# Patient Record
Sex: FEMALE | Race: ASIAN | Employment: UNEMPLOYED | URBAN - METROPOLITAN AREA
[De-identification: names, ages, dates, MRNs, and addresses within clinical notes are randomized per-mention and may not be internally consistent; named-entity substitution may affect disease eponyms.]

---

## 2018-10-04 ENCOUNTER — OFFICE VISIT (OUTPATIENT)
Dept: FAMILY MEDICINE CLINIC | Age: 28
End: 2018-10-04

## 2018-10-04 VITALS
DIASTOLIC BLOOD PRESSURE: 70 MMHG | HEART RATE: 76 BPM | OXYGEN SATURATION: 98 % | BODY MASS INDEX: 19.94 KG/M2 | WEIGHT: 95 LBS | RESPIRATION RATE: 14 BRPM | TEMPERATURE: 98.7 F | HEIGHT: 58 IN | SYSTOLIC BLOOD PRESSURE: 104 MMHG

## 2018-10-04 DIAGNOSIS — R53.82 CHRONIC FATIGUE: ICD-10-CM

## 2018-10-04 DIAGNOSIS — J32.4 CHRONIC PANSINUSITIS: Primary | ICD-10-CM

## 2018-10-04 DIAGNOSIS — M62.830 LUMBAR PARASPINAL MUSCLE SPASM: ICD-10-CM

## 2018-10-04 DIAGNOSIS — R42 DIZZINESS: ICD-10-CM

## 2018-10-04 RX ORDER — AZITHROMYCIN 250 MG/1
TABLET, FILM COATED ORAL
Qty: 6 TAB | Refills: 0 | Status: SHIPPED | OUTPATIENT
Start: 2018-10-04 | End: 2018-10-09

## 2018-10-04 RX ORDER — MELOXICAM 7.5 MG/1
7.5 TABLET ORAL
Qty: 30 TAB | Refills: 2 | Status: SHIPPED | OUTPATIENT
Start: 2018-10-04 | End: 2019-01-21 | Stop reason: SDUPTHER

## 2018-10-04 RX ORDER — CETIRIZINE HCL 10 MG
10 TABLET ORAL
Qty: 30 TAB | Refills: 0 | Status: SHIPPED | OUTPATIENT
Start: 2018-10-04 | End: 2018-10-30 | Stop reason: SDUPTHER

## 2018-10-04 RX ORDER — PREDNISONE 5 MG/1
5 TABLET ORAL 2 TIMES DAILY
Qty: 10 TAB | Refills: 0 | Status: SHIPPED | OUTPATIENT
Start: 2018-10-04 | End: 2018-12-18

## 2018-10-04 NOTE — MR AVS SNAPSHOT
[facility] 
 
 
 [address] [place] [address] 
[phone] Patient: Gabbie Kelly MRN: [id number] HUH:[id number] Visit Information Date & Time Provider Department Dept. Phone Encounter #  
 10/4/2018 11:00 AM Feng Cross, [address] [facility] [phone] [id number] Follow-up Instructions Return in about 4 months (around 2/4/2019) for well woman. Your Appointments 1/9/2019  3:00 PM  
New Patient with Feng Cross MD  
[facility]) Appt Note: NP/ weak and dizzy/07/1818.kf; R/S 9/12/18 NEW PT 0cp0pb arh 9/12/18  
 [address] [address]  
[phone]  
  
   
 [address] [address] Upcoming Health Maintenance Date Due Influenza Age 5 to Adult 3/31/2019* PAP AKA CERVICAL CYTOLOGY 7/1/2019 DTaP/Tdap/Td series (2 - Td) 1/23/2024 *Topic was postponed. The date shown is not the original due date. Allergies as of 10/4/2018  Review Complete On: 10/4/2018 By: Feng Cross MD  
 No Known Allergies Current Immunizations  Reviewed on 3/4/2014 Name Date Hep B Vaccine 10/23/2013, 8/27/2013 Influenza Vaccine 1/23/2014 MMR 10/23/2013, 8/27/2013 Td 8/27/2013 Tdap 1/23/2014 Not reviewed this visit You Were Diagnosed With   
  
 Codes Comments Chronic pansinusitis    -  Primary ICD-10-CM: J32.4 ICD-9-CM: 473.8 Lumbar paraspinal muscle spasm     ICD-10-CM: R94.580 ICD-9-CM: 724.8 Chronic fatigue     ICD-10-CM: R53.82 
ICD-9-CM: 780.79 Dizziness     ICD-10-CM: F90 ICD-9-CM: 780.4 Vitals BP Pulse Temp Resp Height(growth percentile) Weight(growth percentile) 104/70 (BP 1 Location: Left arm, BP Patient Position: Sitting) 76 98.7 °F (37.1 °C) (Oral) 14 4' 10\" (1.473 m) 95 lb (43.1 kg) LMP SpO2 BMI OB Status Smoking Status 09/23/2018 98% 19.86 kg/m2 Having regular periods Never Smoker Vitals History BMI and BSA Data Body Mass Index Body Surface Area  
 19.86 kg/m 2 1.33 m 2 Preferred Pharmacy Pharmacy Name Phone [address] [place] [address], [address] [place] [address] Rd. [phone] Your Updated Medication List  
  
   
This list is accurate as of 10/4/18 11:52 AM.  Always use your most recent med list.  
  
  
  
  
 azithromycin 250 mg tablet Commonly known as:  Fazal Marcus Take 2 tablets today, then take 1 tablet daily  
  
 cetirizine 10 mg tablet Commonly known as:  ZYRTEC Take 1 Tab by mouth nightly. meloxicam 7.5 mg tablet Commonly known as:  MOBIC Take 1 Tab by mouth nightly. predniSONE 5 mg tablet Commonly known as:  Latrelle Bryon Take 1 Tab by mouth two (2) times a day. Prescriptions Sent to Pharmacy Refills  
 meloxicam (MOBIC) 7.5 mg tablet 2 Sig: Take 1 Tab by mouth nightly. Class: Normal  
 Pharmacy: [facility] DR SABAS Rai , [address] [place] [address] Ph #: [phone] Route: Oral  
 azithromycin (ZITHROMAX) 250 mg tablet 0 Sig: Take 2 tablets today, then take 1 tablet daily Class: Normal  
 Pharmacy: [facility] DR SABAS SANTIZO [address] [place] [address],[facility] Ph #: [phone]  
 predniSONE (DELTASONE) 5 mg tablet 0 Sig: Take 1 Tab by mouth two (2) times a day. Class: Normal  
 Pharmacy: [facility] DR SABAS Rai , [address] [place] [address] Ph #: [phone] Route: Oral  
 cetirizine (ZYRTEC) 10 mg tablet 0 Sig: Take 1 Tab by mouth nightly. Class: Normal  
 Pharmacy: [facility] DR SABAS Rai , [address] [place] [address] Ph #: [phone] Route: Oral  
  
We Performed the Following CBC WITH AUTOMATED DIFF [85878 CPT(R)] METABOLIC PANEL, COMPREHENSIVE [93655 CPT(R)] TSH REFLEX TO T4 [68227 CPT(R)] VITAMIN D, 25 HYDROXY [id number] CPT(R)] Follow-up Instructions Return in about 4 months (around 2/4/2019) for well woman. Patient Instructions Dizziness: Care Instructions Your Care Instructions Dizziness is the feeling of unsteadiness or fuzziness in your head. It is different than having vertigo, which is a feeling that the room is spinning or that you are moving or falling. It is also different from lightheadedness, which is the feeling that you are about to faint. It can be hard to know what causes dizziness. Some people feel dizzy when they have migraine headaches. Sometimes bouts of flu can make you feel dizzy. Some medical conditions, such as heart problems or high blood pressure, can make you feel dizzy. Many medicines can cause dizziness, including medicines for high blood pressure, pain, or anxiety. If a medicine causes your symptoms, your doctor may recommend that you stop or change the medicine. If it is a problem with your heart, you may need medicine to help your heart work better. If there is no clear reason for your symptoms, your doctor may suggest watching and waiting for a while to see if the dizziness goes away on its own. Follow-up care is a key part of your treatment and safety. Be sure to make and go to all appointments, and call your doctor if you are having problems. It's also a good idea to know your test results and keep a list of the medicines you take. How can you care for yourself at home? · If your doctor recommends or prescribes medicine, take it exactly as directed. Call your doctor if you think you are having a problem with your medicine. · Do not drive while you feel dizzy. · Try to prevent falls. Steps you can take include: ¨ Using nonskid mats, adding grab bars near the tub, and using night-lights. ¨ Clearing your home so that walkways are free of anything you might trip on. ¨ Letting family and friends know that you have been feeling dizzy. This will help them know how to help you. When should you call for help? Call 911 anytime you think you may need emergency care. For example, call if: 
  · You passed out (lost consciousness).  
  · You have dizziness along with symptoms of a heart attack. These may include: ¨ Chest pain or pressure, or a strange feeling in the chest. 
¨ Sweating. ¨ Shortness of breath. ¨ Nausea or vomiting. ¨ Pain, pressure, or a strange feeling in the back, neck, jaw, or upper belly or in one or both shoulders or arms. ¨ Lightheadedness or sudden weakness. ¨ A fast or irregular heartbeat.  
  · You have symptoms of a stroke. These may include: 
¨ Sudden numbness, tingling, weakness, or loss of movement in your face, arm, or leg, especially on only one side of your body. ¨ Sudden vision changes. ¨ Sudden trouble speaking. ¨ Sudden confusion or trouble understanding simple statements. ¨ Sudden problems with walking or balance. ¨ A sudden, severe headache that is different from past headaches.  
 Call your doctor now or seek immediate medical care if: 
  · You feel dizzy and have a fever, headache, or ringing in your ears.  
  · You have new or increased nausea and vomiting.  
  · Your dizziness does not go away or comes back.  
 Watch closely for changes in your health, and be sure to contact your doctor if: 
  · You do not get better as expected. Where can you learn more? Go to http://isak-norbert.info/. Enter E905 in the search box to learn more about \"Dizziness: Care Instructions. \" Current as of: November 20, 2017 Content Version: 11.8 © 9598-7377 Healthwise, Incorporated. Care instructions adapted under license by [employer] (which disclaims liability or warranty for this information). If you have questions about a medical condition or this instruction, always ask your healthcare professional. [place] [address] any warranty or liability for your use of this information. Introducing [facility] SERVICES! [employer] introduces Xatori patient portal. Now you can access parts of your medical record, email your doctor's office, and request medication refills online. 1. In your internet browser, go to [email or web address] 2. Click on the First Time User? Click Here link in the Sign In box. You will see the New Member Sign Up page. 3. Enter your Xatori Access Code exactly as it appears below. You will not need to use this code after youve completed the sign-up process. If you do not sign up before the expiration date, you must request a new code. · Xatori Access Code: [email or web address] Expires: 11/25/2018  5:21 AM 
 
4. Enter the last four digits of your Social Security Number (xxxx) and Date of Birth (mm/dd/yyyy) as indicated and click Submit. You will be taken to the next sign-up page. 5. Create a Xatori ID. This will be your Xatori login ID and cannot be changed, so think of one that is secure and easy to remember. 6. Create a Xatori password. You can change your password at any time. 7. Enter your Password Reset Question and Answer. This can be used at a later time if you forget your password. 8. Enter your e-mail address. You will receive e-mail notification when new information is available in [address] 9. Click Sign Up. You can now view and download portions of your medical record. 10. Click the Download Summary menu link to download a portable copy of your medical information. If you have questions, please visit the Frequently Asked Questions section of the Xatori website. Remember, Xatori is NOT to be used for urgent needs. For medical emergencies, dial 911. Now available from your iPhone and Android! Please provide this summary of care documentation to your next provider. Your primary care clinician is listed as Laine Fitzpatrick. If you have any questions after today's visit, please call [phone].

## 2018-10-04 NOTE — PROGRESS NOTES
Chief Complaint Patient presents with  Dizziness  
  symptoms occuring for a month  Back Pain  Head Pain  
  symptoms continous , sometimes notices blood from nose 1. Have you been to the ER, urgent care clinic since your last visit? Hospitalized since your last visit? No 
 
2. Have you seen or consulted any other health care providers outside of the [address] [place] [address] since your last visit? Include any pap smears or colon screening.  No

## 2018-10-04 NOTE — PROGRESS NOTES
HISTORY OF PRESENT ILLNESS Brijesh Garrett is a 25 y.o. female. not seen in office for more than 3 years. seen today to reestablish care and concern of worsening headache,dizziness,fatigue and lower back pain. HPI Neurological Review She is here today to talk about headaches. This is a chronic problem. Course to date has been intermittent. Headaches are occuring daily and duration of individual headaches are a few hours ago. The pain is described as throbbing pain, bilateral in the frontal area, bilateral in the occipital area. Associated symptoms: congestion and dizziness. Denies fainting, fever, flashing lights, light sensitivity, nausea and vomiting Medications being used include daily Tylenol. She is taking medications as instructed and no medication side effects noted. Back Pain Patient presents for presents evaluation of low back problems. Symptoms have been present for several months and include pain in both side lower back (aching, dull in character; 5/10 in severity), stiffness in lower back, denies weakness, denies numbness, denies paresthesias. Initial inciting event: none. Symptoms are worst: evening, nighttime. Alleviating factors identifiable by patient are medication and massage and topical analgesic. Exacerbating factors identifiable by patient are standing, sitting, walking. Treatments so far initiated by patient: prn Tylenol and topical gel along with massage Previous lower back problems: none. Previous workup: none. Previous treatments: none. Fatigue Patient complains of fatigue. Symptoms began problem is longstanding. Sentinal symptom the patient feels fatigue began with: excessive menstrual bleeding. Symptoms of her fatigue have been general malaise, diffuse soft tissue aches and pains, headaches. Patient describes the following psychologic symptoms: none.   Patient denies fever, significant change in weight, symptoms of true arthritis, true exercise intolerance, unusual rashes, cold intolerance, constipation and change in hair texture., GI blood loss. The course has been gradually worsening. Severity has been symptoms bothersome, but easily able to carry out all usual work/school/family. Previous visits for this problem: none Duncan Jimenez Review of Systems Constitutional: Positive for malaise/fatigue. Negative for chills and fever. HENT: Negative for congestion, ear pain, sore throat and tinnitus. Eyes: Negative for blurred vision, double vision, pain and discharge. Respiratory: Negative for cough, shortness of breath and wheezing. Cardiovascular: Negative for chest pain, palpitations and leg swelling. Gastrointestinal: Negative for abdominal pain, blood in stool, constipation, diarrhea, nausea and vomiting. Genitourinary: Negative for dysuria, frequency, hematuria and urgency. Musculoskeletal: Positive for back pain. Negative for joint pain and myalgias. Skin: Negative for rash. Neurological: Positive for dizziness and headaches. Negative for tremors and seizures. Endo/Heme/Allergies: Negative for polydipsia. Does not bruise/bleed easily. Psychiatric/Behavioral: Negative for depression and substance abuse. The patient is not nervous/anxious. Physical Exam  
Constitutional: She is oriented to person, place, and time. She appears well-developed and well-nourished. No distress. HENT:  
Head: Normocephalic and atraumatic. Right Ear: Tympanic membrane and external ear normal. No drainage. Tympanic membrane is not injected. No middle ear effusion. No decreased hearing is noted. Left Ear: Tympanic membrane normal. No drainage. Tympanic membrane is not injected. No middle ear effusion. No decreased hearing is noted. Nose: Mucosal edema present. No rhinorrhea. Right sinus exhibits maxillary sinus tenderness and frontal sinus tenderness. Left sinus exhibits maxillary sinus tenderness and frontal sinus tenderness. Mouth/Throat: Uvula is midline. Posterior oropharyngeal erythema present. No oropharyngeal exudate. Nasal mucosa congested, edematous Eyes: Conjunctivae and EOM are normal. Pupils are equal, round, and reactive to light. No scleral icterus. Neck: Normal range of motion. Neck supple. No JVD present. No thyromegaly present. Cardiovascular: Normal rate, regular rhythm, normal heart sounds and intact distal pulses. No murmur heard. Pulmonary/Chest: Effort normal and breath sounds normal. She has no wheezes. She has no rales. Abdominal: Soft. Bowel sounds are normal. She exhibits no distension and no mass. Musculoskeletal: Normal range of motion. She exhibits no edema. Lumbar back: She exhibits tenderness, pain and spasm. She exhibits normal range of motion and no bony tenderness. Back: 
 
Lymphadenopathy:  
     Head (right side): No tonsillar adenopathy present. Head (left side): No tonsillar adenopathy present. She has no cervical adenopathy. Neurological: She is alert and oriented to person, place, and time. She has normal reflexes. No cranial nerve deficit. Skin: Skin is warm and dry. No rash noted. She is not diaphoretic. Psychiatric: She has a normal mood and affect. Nursing note and vitals reviewed. ASSESSMENT and PLAN Diagnoses and all orders for this visit: 
 
1. Chronic pansinusitis 
-     azithromycin (ZITHROMAX) 250 mg tablet; Take 2 tablets today, then take 1 tablet daily -     predniSONE (DELTASONE) 5 mg tablet; Take 1 Tab by mouth two (2) times a day. -     cetirizine (ZYRTEC) 10 mg tablet; Take 1 Tab by mouth nightly. 2. Lumbar paraspinal muscle spasm 
-     meloxicam (MOBIC) 7.5 mg tablet; Take 1 Tab by mouth nightly. 3. Chronic fatigue 
-     CBC WITH AUTOMATED DIFF 
-     METABOLIC PANEL, COMPREHENSIVE 
-     TSH REFLEX TO T4 
-     VITAMIN D, 25 HYDROXY 4. Dizziness 
-     CBC WITH AUTOMATED DIFF 
 
 Discussed lifestyle issues and health guidance given Patient was given an after visit summary which includes diagnoses, vital signs, current medications, instructions and references & authorized prescriptions . Results of labs will be conveyed to patient, once available. Pt verbalized instructions I provided and expressed understanding of discussion that was held today. Follow-up Disposition: 
Return in about 4 months (around 2/4/2019) for well woman.

## 2018-10-04 NOTE — PATIENT INSTRUCTIONS
Dizziness: Care Instructions Your Care Instructions Dizziness is the feeling of unsteadiness or fuzziness in your head. It is different than having vertigo, which is a feeling that the room is spinning or that you are moving or falling. It is also different from lightheadedness, which is the feeling that you are about to faint. It can be hard to know what causes dizziness. Some people feel dizzy when they have migraine headaches. Sometimes bouts of flu can make you feel dizzy. Some medical conditions, such as heart problems or high blood pressure, can make you feel dizzy. Many medicines can cause dizziness, including medicines for high blood pressure, pain, or anxiety. If a medicine causes your symptoms, your doctor may recommend that you stop or change the medicine. If it is a problem with your heart, you may need medicine to help your heart work better. If there is no clear reason for your symptoms, your doctor may suggest watching and waiting for a while to see if the dizziness goes away on its own. Follow-up care is a key part of your treatment and safety. Be sure to make and go to all appointments, and call your doctor if you are having problems. It's also a good idea to know your test results and keep a list of the medicines you take. How can you care for yourself at home? · If your doctor recommends or prescribes medicine, take it exactly as directed. Call your doctor if you think you are having a problem with your medicine. · Do not drive while you feel dizzy. · Try to prevent falls. Steps you can take include: ¨ Using nonskid mats, adding grab bars near the tub, and using night-lights. ¨ Clearing your home so that walkways are free of anything you might trip on. ¨ Letting family and friends know that you have been feeling dizzy. This will help them know how to help you. When should you call for help? Call 911 anytime you think you may need emergency care. For example, call if:   · You passed out (lost consciousness).  
  · You have dizziness along with symptoms of a heart attack. These may include: ¨ Chest pain or pressure, or a strange feeling in the chest. 
¨ Sweating. ¨ Shortness of breath. ¨ Nausea or vomiting. ¨ Pain, pressure, or a strange feeling in the back, neck, jaw, or upper belly or in one or both shoulders or arms. ¨ Lightheadedness or sudden weakness. ¨ A fast or irregular heartbeat.  
  · You have symptoms of a stroke. These may include: 
¨ Sudden numbness, tingling, weakness, or loss of movement in your face, arm, or leg, especially on only one side of your body. ¨ Sudden vision changes. ¨ Sudden trouble speaking. ¨ Sudden confusion or trouble understanding simple statements. ¨ Sudden problems with walking or balance. ¨ A sudden, severe headache that is different from past headaches.  
 Call your doctor now or seek immediate medical care if: 
  · You feel dizzy and have a fever, headache, or ringing in your ears.  
  · You have new or increased nausea and vomiting.  
  · Your dizziness does not go away or comes back.  
 Watch closely for changes in your health, and be sure to contact your doctor if: 
  · You do not get better as expected. Where can you learn more? Go to http://isak-norbert.info/. Enter Z632 in the search box to learn more about \"Dizziness: Care Instructions. \" Current as of: November 20, 2017 Content Version: 11.8 © 6271-7004 [employer]. Care instructions adapted under license by [employer] (which disclaims liability or warranty for this information). If you have questions about a medical condition or this instruction, always ask your healthcare professional. [place] [address] any warranty or liability for your use of this information.

## 2018-10-05 LAB
25(OH)D3+25(OH)D2 SERPL-MCNC: 17 NG/ML (ref 30–100)
ALBUMIN SERPL-MCNC: 4.8 G/DL (ref 3.5–5.5)
ALBUMIN/GLOB SERPL: 1.3 {RATIO} (ref 1.2–2.2)
ALP SERPL-CCNC: 54 IU/L (ref 39–117)
ALT SERPL-CCNC: 10 IU/L (ref 0–32)
AST SERPL-CCNC: 15 IU/L (ref 0–40)
BASOPHILS # BLD AUTO: 0 X10E3/UL (ref 0–0.2)
BASOPHILS NFR BLD AUTO: 1 %
BILIRUB SERPL-MCNC: 0.9 MG/DL (ref 0–1.2)
BUN SERPL-MCNC: 11 MG/DL (ref 6–20)
BUN/CREAT SERPL: 20 (ref 9–23)
CALCIUM SERPL-MCNC: 9.4 MG/DL (ref 8.7–10.2)
CHLORIDE SERPL-SCNC: 103 MMOL/L (ref 96–106)
CO2 SERPL-SCNC: 22 MMOL/L (ref 20–29)
CREAT SERPL-MCNC: 0.54 MG/DL (ref 0.57–1)
EOSINOPHIL # BLD AUTO: 0.2 X10E3/UL (ref 0–0.4)
EOSINOPHIL NFR BLD AUTO: 3 %
ERYTHROCYTE [DISTWIDTH] IN BLOOD BY AUTOMATED COUNT: 14.7 % (ref 12.3–15.4)
GLOBULIN SER CALC-MCNC: 3.6 G/DL (ref 1.5–4.5)
GLUCOSE SERPL-MCNC: 92 MG/DL (ref 65–99)
HCT VFR BLD AUTO: 37.5 % (ref 34–46.6)
HGB BLD-MCNC: 12.6 G/DL (ref 11.1–15.9)
IMM GRANULOCYTES # BLD: 0 X10E3/UL (ref 0–0.1)
IMM GRANULOCYTES NFR BLD: 0 %
LYMPHOCYTES # BLD AUTO: 2.5 X10E3/UL (ref 0.7–3.1)
LYMPHOCYTES NFR BLD AUTO: 36 %
MCH RBC QN AUTO: 30.2 PG (ref 26.6–33)
MCHC RBC AUTO-ENTMCNC: 33.6 G/DL (ref 31.5–35.7)
MCV RBC AUTO: 90 FL (ref 79–97)
MONOCYTES # BLD AUTO: 0.4 X10E3/UL (ref 0.1–0.9)
MONOCYTES NFR BLD AUTO: 7 %
NEUTROPHILS # BLD AUTO: 3.6 X10E3/UL (ref 1.4–7)
NEUTROPHILS NFR BLD AUTO: 53 %
PLATELET # BLD AUTO: 294 X10E3/UL (ref 150–379)
POTASSIUM SERPL-SCNC: 4.2 MMOL/L (ref 3.5–5.2)
PROT SERPL-MCNC: 8.4 G/DL (ref 6–8.5)
RBC # BLD AUTO: 4.17 X10E6/UL (ref 3.77–5.28)
SODIUM SERPL-SCNC: 139 MMOL/L (ref 134–144)
TSH SERPL DL<=0.005 MIU/L-ACNC: 2.88 UIU/ML (ref 0.45–4.5)
WBC # BLD AUTO: 6.8 X10E3/UL (ref 3.4–10.8)

## 2018-10-05 RX ORDER — ERGOCALCIFEROL 1.25 MG/1
50000 CAPSULE ORAL
Qty: 4 CAP | Refills: 5 | Status: SHIPPED | OUTPATIENT
Start: 2018-10-05 | End: 2019-01-09 | Stop reason: SDUPTHER

## 2018-10-05 NOTE — PROGRESS NOTES
Outbound call to patient. Name and  verified. Reviewed recent labs with patient. She verbalized understanding.

## 2018-10-05 NOTE — PROGRESS NOTES
Please inform patient. Blood count, kidney, liver, thyroid results are normal 
Vitamin D is very low, will send Rx to pharmacy, to take as directed for 6 months. Dizziness and headache most likely from her sinusitis. To complete course To keep appointment in January for pelvic exam, as she c/o heavy painful periods.  
thanks

## 2018-10-30 DIAGNOSIS — J32.4 CHRONIC PANSINUSITIS: ICD-10-CM

## 2018-10-31 RX ORDER — CETIRIZINE HYDROCHLORIDE 10 MG/1
TABLET, FILM COATED ORAL
Qty: 30 TAB | Refills: 0 | Status: SHIPPED | OUTPATIENT
Start: 2018-10-31 | End: 2019-01-09 | Stop reason: ALTCHOICE

## 2018-12-18 ENCOUNTER — OFFICE VISIT (OUTPATIENT)
Dept: URGENT CARE | Age: 28
End: 2018-12-18

## 2018-12-18 VITALS
WEIGHT: 97 LBS | RESPIRATION RATE: 18 BRPM | TEMPERATURE: 98.4 F | DIASTOLIC BLOOD PRESSURE: 53 MMHG | OXYGEN SATURATION: 99 % | BODY MASS INDEX: 20.36 KG/M2 | SYSTOLIC BLOOD PRESSURE: 100 MMHG | HEIGHT: 58 IN | HEART RATE: 62 BPM

## 2018-12-18 DIAGNOSIS — J06.9 VIRAL UPPER RESPIRATORY ILLNESS: ICD-10-CM

## 2018-12-18 DIAGNOSIS — J02.9 SORE THROAT: ICD-10-CM

## 2018-12-18 LAB
S PYO AG THROAT QL: NEGATIVE
VALID INTERNAL CONTROL?: YES

## 2018-12-18 RX ORDER — BENZONATATE 100 MG/1
100 CAPSULE ORAL
Qty: 21 CAP | Refills: 0 | Status: SHIPPED | OUTPATIENT
Start: 2018-12-18 | End: 2018-12-25

## 2018-12-18 RX ORDER — GUAIFENESIN 600 MG/1
600 TABLET, EXTENDED RELEASE ORAL 2 TIMES DAILY
Qty: 14 TAB | Refills: 0 | Status: SHIPPED | OUTPATIENT
Start: 2018-12-18 | End: 2018-12-25

## 2018-12-18 NOTE — PROGRESS NOTES
Arcadio Mohs is a 25 y.o. female who present complaining of cold-like symptoms: dry cough, nasal congestion, runny nose, sore throat. Symptom onset 2 days ago without any preceding illness. Has tried warm water with some relief in sore throat . No aggravating or alleviating factors. Symptoms are constant and overall worsening. Promotes no decrease in PO intake of fluids. Specifically denies: severe lethargy, SOB, syncope/near syncope, vomiting/diarrhea, chest pain, chest pain with breathing, labored breathing, severe headache, non-intractable fevers . Hx significant for:  has a past medical history of Migraine. Not a smoker               Past Medical History:   Diagnosis Date    Migraine     since age 11 years        Past Surgical History:   Procedure Laterality Date    HX GYN      2016         Family History   Problem Relation Age of Onset    No Known Problems Mother     No Known Problems Father         Social History     Socioeconomic History    Marital status: SINGLE     Spouse name: Not on file    Number of children: Not on file    Years of education: Not on file    Highest education level: Not on file   Social Needs    Financial resource strain: Not on file    Food insecurity - worry: Not on file    Food insecurity - inability: Not on file   [employer] needs - medical: Not on file   [employer] needs - non-medical: Not on file   Occupational History    Not on file   Tobacco Use    Smoking status: Never Smoker    Smokeless tobacco: Never Used   Substance and Sexual Activity    Alcohol use: No     Alcohol/week: 0.0 oz    Drug use: No    Sexual activity: Yes     Partners: Male   Other Topics Concern    Not on file   Social History Narrative    Not on file                ALLERGIES: Patient has no known allergies. Review of Systems   Constitutional: Negative for chills and fever. Respiratory: Negative for shortness of breath and wheezing.     Cardiovascular: Negative for chest pain and palpitations. Gastrointestinal: Negative for nausea and vomiting. Musculoskeletal: Negative for neck stiffness. Skin: Negative for rash. Neurological: Negative for dizziness and weakness. All other systems reviewed and are negative. Vitals:    12/18/18 1318 12/18/18 1321   BP:  100/53   Pulse:  62   Resp:  18   Temp:  98.4 °F (36.9 °C)   SpO2:  99%   Weight:  97 lb (44 kg)   Height: 4' 10\" (1.473 m) 4' 10\" (1.473 m)       Physical Exam   Constitutional: She is oriented to person, place, and time. No distress. Non-toxic appearing, well hydrated   HENT:     TMs normal appearing bilat  Erythematous nasal turbinates with clear rhinorrhea  OP mild erythema without swelling or exudate. Uvula midline. No oral lesions. Eyes: Conjunctivae and EOM are normal. Pupils are equal, round, and reactive to light. No scleral icterus. Neck: Normal range of motion. Neck supple. Cardiovascular: Normal rate, regular rhythm and normal heart sounds. Exam reveals no gallop and no friction rub. No murmur heard. Pulmonary/Chest: Effort normal and breath sounds normal. No respiratory distress. She has no wheezes. She has no rales. Lymphadenopathy:     She has no cervical adenopathy. Neurological: She is alert and oriented to person, place, and time. Skin: Skin is warm and dry. No rash noted. She is not diaphoretic. No erythema. No pallor. Psychiatric: She has a normal mood and affect. Her behavior is normal. Judgment and thought content normal.   Nursing note and vitals reviewed. MDM     Differential Diagnosis; Clinical Impression; Plan:       CLINICAL IMPRESSION:  (J06.9) Viral upper respiratory illness  (J02.9) Sore throat    Orders Placed This Encounter      AMB POC RAPID STREP A    RX      benzonatate (TESSALON PERLES) 100 mg capsule      guaiFENesin ER (MUCINEX) 600 mg ER tablet      Plan:  Viral URI without complication    1. See above orders  2. Review provided clinical summary  3.  Throat lozenges, soothing fluids, 1 tsp honey nightly, maintain adequate fluid intake, humidified air at night. 4. Follow up with PCP without some improvement in next 7 days. Otherwise follow up immediately here at Urgent Care for new or worsening signs/symptmos especially ones we have discussed in detail. We have reviewed concerning signs/symptoms, normal vs abnormal progression of medical condition and when to seek immediate medical attention.             Results for orders placed or performed in visit on 12/18/18   AMB POC RAPID STREP A   Result Value Ref Range    VALID INTERNAL CONTROL POC Yes     Group A Strep Ag Negative Negative         Procedures

## 2018-12-18 NOTE — PATIENT INSTRUCTIONS
Follow up with PCP without improvement over next 5-7 days sooner/immediately for new or worsening       Viral Respiratory Infection: Care Instructions  Your Care Instructions    Viruses are very small organisms. They grow in number after they enter your body. There are many types that cause different illnesses, such as colds and the mumps. The symptoms of a viral respiratory infection often start quickly. They include a fever, sore throat, and runny nose. You may also just not feel well. Or you may not want to eat much. Most viral respiratory infections are not serious. They usually get better with time and self-care. Antibiotics are not used to treat a viral infection. That's because antibiotics will not help cure a viral illness. In some cases, antiviral medicine can help your body fight a serious viral infection. Follow-up care is a key part of your treatment and safety. Be sure to make and go to all appointments, and call your doctor if you are having problems. It's also a good idea to know your test results and keep a list of the medicines you take. How can you care for yourself at home? · Rest as much as possible until you feel better. · Be safe with medicines. Take your medicine exactly as prescribed. Call your doctor if you think you are having a problem with your medicine. You will get more details on the specific medicine your doctor prescribes. · Take an over-the-counter pain medicine, such as acetaminophen (Tylenol), ibuprofen (Advil, Motrin), or naproxen (Aleve), as needed for pain and fever. Read and follow all instructions on the label. Do not give aspirin to anyone younger than 20. It has been linked to Reye syndrome, a serious illness. · Drink plenty of fluids, enough so that your urine is light yellow or clear like water. Hot fluids, such as tea or soup, may help relieve congestion in your nose and throat.  If you have kidney, heart, or liver disease and have to limit fluids, talk with your doctor before you increase the amount of fluids you drink. · Try to clear mucus from your lungs by breathing deeply and coughing. · Gargle with warm salt water once an hour. This can help reduce swelling and throat pain. Use 1 teaspoon of salt mixed in 1 cup of warm water. · Do not smoke or allow others to smoke around you. If you need help quitting, talk to your doctor about stop-smoking programs and medicines. These can increase your chances of quitting for good. To avoid spreading the virus  · Cough or sneeze into a tissue. Then throw the tissue away. · If you don't have a tissue, use your hand to cover your cough or sneeze. Then clean your hand. You can also cough into your sleeve. · Wash your hands often. Use soap and warm water. Wash for 15 to 20 seconds each time. · If you don't have soap and water near you, you can clean your hands with alcohol wipes or gel. When should you call for help? Call your doctor now or seek immediate medical care if:    · You have a new or higher fever.     · Your fever lasts more than 48 hours.     · You have trouble breathing.     · You have a fever with a stiff neck or a severe headache.     · You are sensitive to light.     · You feel very sleepy or confused.    Watch closely for changes in your health, and be sure to contact your doctor if:    · You do not get better as expected. Where can you learn more? Go to http://isak-norbert.info/. Enter H981 in the search box to learn more about \"Viral Respiratory Infection: Care Instructions. \"  Current as of: December 6, 2017  Content Version: 11.8  © 9608-1694 [employer]. Care instructions adapted under license by [employer] (which disclaims liability or warranty for this information).  If you have questions about a medical condition or this instruction, always ask your healthcare professional. [address] any warranty or liability for your use of this information.

## 2019-01-09 ENCOUNTER — HOSPITAL ENCOUNTER (OUTPATIENT)
Dept: LAB | Age: 29
Discharge: HOME | End: 2019-01-09
Payer: COMMERCIAL

## 2019-01-09 ENCOUNTER — OFFICE VISIT (OUTPATIENT)
Dept: FAMILY MEDICINE CLINIC | Age: 29
End: 2019-01-09

## 2019-01-09 VITALS
DIASTOLIC BLOOD PRESSURE: 70 MMHG | TEMPERATURE: 98 F | SYSTOLIC BLOOD PRESSURE: 104 MMHG | BODY MASS INDEX: 20.57 KG/M2 | OXYGEN SATURATION: 99 % | HEART RATE: 61 BPM | RESPIRATION RATE: 14 BRPM | WEIGHT: 98 LBS | HEIGHT: 58 IN

## 2019-01-09 DIAGNOSIS — Z01.419 WELL WOMAN EXAM WITH ROUTINE GYNECOLOGICAL EXAM: Primary | ICD-10-CM

## 2019-01-09 DIAGNOSIS — E55.9 VITAMIN D DEFICIENCY: ICD-10-CM

## 2019-01-09 DIAGNOSIS — F50.89: ICD-10-CM

## 2019-01-09 DIAGNOSIS — L23.9 ALLERGIC CONTACT DERMATITIS, UNSPECIFIED TRIGGER: ICD-10-CM

## 2019-01-09 PROCEDURE — 88175 CYTOPATH C/V AUTO FLUID REDO: CPT

## 2019-01-09 PROCEDURE — 87624 HPV HI-RISK TYP POOLED RSLT: CPT

## 2019-01-09 RX ORDER — TRIAMCINOLONE ACETONIDE 5 MG/G
OINTMENT TOPICAL 2 TIMES DAILY
Qty: 30 G | Refills: 0 | Status: SHIPPED | OUTPATIENT
Start: 2019-01-09 | End: 2020-07-02 | Stop reason: ALTCHOICE

## 2019-01-09 RX ORDER — ERGOCALCIFEROL 1.25 MG/1
50000 CAPSULE ORAL
Qty: 4 CAP | Refills: 5 | Status: SHIPPED | OUTPATIENT
Start: 2019-01-09 | End: 2019-06-23 | Stop reason: SDUPTHER

## 2019-01-09 RX ORDER — PREDNISONE 5 MG/1
TABLET ORAL
Refills: 0 | COMMUNITY
Start: 2018-10-04 | End: 2019-01-09 | Stop reason: ALTCHOICE

## 2019-01-09 RX ORDER — MIRTAZAPINE 7.5 MG/1
7.5 TABLET, FILM COATED ORAL
Qty: 30 TAB | Refills: 1 | Status: SHIPPED | OUTPATIENT
Start: 2019-01-09 | End: 2019-04-08 | Stop reason: SDUPTHER

## 2019-01-09 NOTE — PROGRESS NOTES
Chief Complaint Patient presents with  Well Woman  Dizziness  
  still experience some episodes of dizziness  Skin Problem  
  rash in armpits ( bilateral ) with itching 1. Have you been to the ER, urgent care clinic since your last visit? Urgent Care. Hospitalized since your last visit? No 
 
2. Have you seen or consulted any other health care providers outside of the [address] [place] [address] since your last visit? Include any pap smears or colon screening.  No

## 2019-01-09 NOTE — PATIENT INSTRUCTIONS

## 2019-01-09 NOTE — PROGRESS NOTES
HISTORY OF PRESENT ILLNESS Franky Browne is a 30 y.o. female. she was seen for well woman exam and itchy rash in right axilla. HPI Routine Gyn Exam 
Patient here for routine exam.  Current Complaints: none. Personal Health Questionnaire Reviewed: yes Gynecologic History Patient's last menstrual period was 2018. Contraception: none Last Pap: ,  
Results: normal 
Last Mammogram: n/a Obstetric History : 1 Para: 1 AB: 0 
 
[facility] 2016 Dermatology Review She is here to talk about itchy lesions right axilla. She noticed it gradual and a few days ago, with unchanged since that time. Location: right axilla. Symptoms include itching. She denies: recent travel, new medications, changed in soaps/detergents, change in diet, new pets. Treatment to date has included none. lot Review of Systems Constitutional: Negative for chills, fever and malaise/fatigue. HENT: Negative for congestion, ear pain, sore throat and tinnitus. Eyes: Negative for blurred vision, double vision, pain and discharge. Respiratory: Negative for cough, shortness of breath and wheezing. Cardiovascular: Negative for chest pain, palpitations and leg swelling. Gastrointestinal: Negative for abdominal pain, blood in stool, constipation, diarrhea, nausea and vomiting. Genitourinary: Negative for dysuria, frequency, hematuria and urgency. Musculoskeletal: Negative for back pain, joint pain and myalgias. Skin: Positive for itching and rash. Neurological: Negative for dizziness, tremors, seizures and headaches. Endo/Heme/Allergies: Negative for polydipsia. Does not bruise/bleed easily. Psychiatric/Behavioral: Negative for depression and substance abuse. The patient is not nervous/anxious. Physical Exam  
Constitutional: She is oriented to person, place, and time. She appears well-developed and well-nourished. HENT:  
Head: Normocephalic and atraumatic. Right Ear: External ear normal.  
Left Ear: External ear normal.  
Nose: Nose normal.  
Mouth/Throat: Oropharynx is clear and moist.  
Eyes: Conjunctivae and EOM are normal. Pupils are equal, round, and reactive to light. No scleral icterus. Neck: Normal range of motion. Neck supple. No JVD present. No thyromegaly present. Cardiovascular: Normal rate, regular rhythm, normal heart sounds and intact distal pulses. Exam reveals no gallop and no friction rub. No murmur heard. Pulmonary/Chest: Effort normal and breath sounds normal. She has no wheezes. She has no rales. She exhibits no tenderness. Right breast exhibits no inverted nipple, no mass, no nipple discharge, no skin change and no tenderness. Left breast exhibits no inverted nipple, no mass, no nipple discharge, no skin change and no tenderness. Breasts are symmetrical.  
Abdominal: Soft. Bowel sounds are normal. She exhibits no distension and no mass. There is no tenderness. Genitourinary: Uterus normal. No breast swelling, tenderness, discharge or bleeding. Cervix exhibits no motion tenderness, no discharge and no friability. Right adnexum displays no mass and no tenderness. Left adnexum displays no mass and no tenderness. Vaginal discharge found. Musculoskeletal: Normal range of motion. She exhibits no edema or tenderness. Lymphadenopathy:  
  She has no cervical adenopathy. She has no axillary adenopathy. Right: No inguinal and no supraclavicular adenopathy present. Left: No inguinal and no supraclavicular adenopathy present. Neurological: She is alert and oriented to person, place, and time. She has normal reflexes. No cranial nerve deficit. Sensations normal  
Skin: Skin is warm and dry. No rash noted. Few Erythematous patches right axilla Psychiatric: She has a normal mood and affect. Her behavior is normal. Judgment and thought content normal.  
Nursing note and vitals reviewed.  
 
 
ASSESSMENT and PLAN 
 Diagnoses and all orders for this visit: 
 
1. Well woman exam without gynecological exam 
-     PAP IG, APTIMA HPV AND RFX 16/18,45 ([id number]) 2. Non-organic loss of appetite 
-     mirtazapine (REMERON) 7.5 mg tablet; Take 1 Tab by mouth nightly. 3. Vitamin D deficiency 
-     ergocalciferol (ERGOCALCIFEROL) 50,000 unit capsule; Take 1 Cap by mouth every seven (7) days. 4. Allergic contact dermatitis, unspecified trigger 
-     triamcinolone acetonide (KENALOG) 0.5 % ointment; Apply  to affected area two (2) times a day. use thin layer Discussed lifestyle issues and health guidance given Patient was given an after visit summary which includes diagnoses, vital signs, current medications, instructions and references & authorized prescriptions . Results of labs will be conveyed to patient, once available. Pt verbalized instructions I provided and expressed understanding of discussion that was held today. Follow-up Disposition: Not on File

## 2019-01-11 ENCOUNTER — TELEPHONE (OUTPATIENT)
Dept: FAMILY MEDICINE CLINIC | Age: 29
End: 2019-01-11

## 2019-01-11 DIAGNOSIS — Z01.419 PAP SMEAR, AS PART OF ROUTINE GYNECOLOGICAL EXAMINATION: Primary | ICD-10-CM

## 2019-01-11 NOTE — TELEPHONE ENCOUNTER
Spoke to Jamie at [facility] department. Z00 not covered for pap smear. Changed code to Z01.419, well woman exam with pap smear in chart and verbal order was given. Code was updated in [facility].

## 2019-01-11 NOTE — TELEPHONE ENCOUNTER
----- Message from Deniz Ayala sent at 1/11/2019 11:05 AM EST -----  Regarding: Dr. Chi Media telephone   Contact: [phone]  Jenny Galvez with [facility] is requesting a call back in regards to a new diagnosis code for Pt pap screening.

## 2019-01-17 NOTE — PROGRESS NOTES
Unable to reach patient via telephone second attempt no answer unable to leave message. Letter printed with normal test results.

## 2019-01-21 DIAGNOSIS — M62.830 LUMBAR PARASPINAL MUSCLE SPASM: ICD-10-CM

## 2019-01-21 RX ORDER — MELOXICAM 7.5 MG/1
7.5 TABLET ORAL
Qty: 90 TAB | Refills: 0 | Status: SHIPPED | OUTPATIENT
Start: 2019-01-21 | End: 2019-04-15 | Stop reason: SDUPTHER

## 2019-01-21 NOTE — TELEPHONE ENCOUNTER
Pharmacy requesting medication refill for a 90 day supply     Requested Prescriptions     Pending Prescriptions Disp Refills    meloxicam (MOBIC) 7.5 mg tablet 30 Tab 2     Sig: Take 1 Tab by mouth nightly.      Pharmacy on file verified

## 2019-04-08 DIAGNOSIS — F50.89: ICD-10-CM

## 2019-04-08 RX ORDER — MIRTAZAPINE 7.5 MG/1
7.5 TABLET, FILM COATED ORAL
Qty: 30 TAB | Refills: 1 | Status: SHIPPED | OUTPATIENT
Start: 2019-04-08 | End: 2019-04-17 | Stop reason: SDUPTHER

## 2019-04-08 NOTE — TELEPHONE ENCOUNTER
Pharmacy requesting medication refill 90 day supply     Requested Prescriptions     Pending Prescriptions Disp Refills    mirtazapine (REMERON) 7.5 mg tablet 30 Tab 1     Sig: Take 1 Tab by mouth nightly.      Pharmacy on file verified  ([facility] [phone])

## 2019-04-15 DIAGNOSIS — M62.830 LUMBAR PARASPINAL MUSCLE SPASM: ICD-10-CM

## 2019-04-15 RX ORDER — MELOXICAM 7.5 MG/1
TABLET ORAL
Qty: 90 TAB | Refills: 0 | Status: SHIPPED | OUTPATIENT
Start: 2019-04-15 | End: 2019-07-13 | Stop reason: SDUPTHER

## 2019-04-17 DIAGNOSIS — F50.89: ICD-10-CM

## 2019-04-17 RX ORDER — MIRTAZAPINE 7.5 MG/1
7.5 TABLET, FILM COATED ORAL
Qty: 90 TAB | Refills: 0 | Status: SHIPPED | OUTPATIENT
Start: 2019-04-17 | End: 2019-07-27 | Stop reason: SDUPTHER

## 2019-04-17 NOTE — TELEPHONE ENCOUNTER
Pharmacy requesting 90 day supply     Requested Prescriptions     Pending Prescriptions Disp Refills    mirtazapine (REMERON) 7.5 mg tablet 30 Tab 1     Sig: Take 1 Tab by mouth nightly.      Pharmacy on file verified  ([facility] [phone])

## 2019-06-23 DIAGNOSIS — E55.9 VITAMIN D DEFICIENCY: ICD-10-CM

## 2019-06-23 RX ORDER — ERGOCALCIFEROL 1.25 MG/1
CAPSULE ORAL
Qty: 12 CAP | Refills: 1 | Status: SHIPPED | OUTPATIENT
Start: 2019-06-23 | End: 2019-11-27 | Stop reason: SDUPTHER

## 2019-07-13 DIAGNOSIS — M62.830 LUMBAR PARASPINAL MUSCLE SPASM: ICD-10-CM

## 2019-07-14 RX ORDER — MELOXICAM 7.5 MG/1
TABLET ORAL
Qty: 90 TAB | Refills: 0 | Status: SHIPPED | OUTPATIENT
Start: 2019-07-14 | End: 2019-10-16 | Stop reason: SDUPTHER

## 2019-07-27 DIAGNOSIS — F50.89: ICD-10-CM

## 2019-07-27 RX ORDER — MIRTAZAPINE 7.5 MG/1
TABLET, FILM COATED ORAL
Qty: 90 TAB | Refills: 0 | Status: SHIPPED | OUTPATIENT
Start: 2019-07-27 | End: 2019-10-23 | Stop reason: SDUPTHER

## 2019-10-16 DIAGNOSIS — M62.830 LUMBAR PARASPINAL MUSCLE SPASM: ICD-10-CM

## 2019-10-16 RX ORDER — MELOXICAM 7.5 MG/1
TABLET ORAL
Qty: 90 TAB | Refills: 0 | Status: SHIPPED | OUTPATIENT
Start: 2019-10-16 | End: 2020-07-02 | Stop reason: ALTCHOICE

## 2019-10-23 DIAGNOSIS — F50.89: ICD-10-CM

## 2019-10-23 RX ORDER — MIRTAZAPINE 7.5 MG/1
TABLET, FILM COATED ORAL
Qty: 90 TAB | Refills: 0 | Status: SHIPPED | OUTPATIENT
Start: 2019-10-23 | End: 2020-07-02 | Stop reason: ALTCHOICE

## 2020-03-30 ENCOUNTER — OFFICE VISIT (OUTPATIENT)
Dept: URGENT CARE | Age: 30
End: 2020-03-30

## 2020-03-30 VITALS — OXYGEN SATURATION: 99 % | RESPIRATION RATE: 16 BRPM | TEMPERATURE: 97.9 F | HEART RATE: 69 BPM

## 2020-03-30 DIAGNOSIS — R05.9 COUGH: Primary | ICD-10-CM

## 2020-03-30 DIAGNOSIS — R07.0 THROAT PAIN: ICD-10-CM

## 2020-03-30 RX ORDER — BENZONATATE 200 MG/1
200 CAPSULE ORAL
Qty: 21 CAP | Refills: 0 | Status: SHIPPED | OUTPATIENT
Start: 2020-03-30 | End: 2020-04-06

## 2020-03-30 RX ORDER — PROMETHAZINE HYDROCHLORIDE AND DEXTROMETHORPHAN HYDROBROMIDE 6.25; 15 MG/5ML; MG/5ML
5 SYRUP ORAL
Qty: 60 ML | Refills: 0 | Status: SHIPPED | OUTPATIENT
Start: 2020-03-30 | End: 2020-07-02 | Stop reason: ALTCHOICE

## 2020-03-30 NOTE — LETTER
NOTIFICATION RETURN TO WORK / SCHOOL 
 
3/30/2020 1:21 PM 
 
Ms. Nanda Dalal 
[address] [facility] [address] To Whom It May Concern: 
 
Nanda Dalal was seen  [facility] [place] [address] today. If there are questions or concerns please have the patient contact our office. Sincerely, Luis Alberto Ramirez MD

## 2020-03-30 NOTE — PROGRESS NOTES
Cold Symptoms   The history is provided by the patient. This is a new problem. The current episode started more than 2 days ago. The problem occurs constantly. The problem has not changed since onset. The cough is non-productive. There has been no fever. Associated symptoms include sore throat. Pertinent negatives include no chest pain, no chills, no eye redness, no shortness of breath and no wheezing. She has tried nothing for the symptoms. She is not a smoker. Her past medical history does not include pneumonia or asthma.         Past Medical History:   Diagnosis Date    Migraine     since age 11 years        Past Surgical History:   Procedure Laterality Date    HX  SECTION  2016         Family History   Problem Relation Age of Onset    No Known Problems Mother     No Known Problems Father         Social History     Socioeconomic History    Marital status:      Spouse name: Not on file    Number of children: Not on file    Years of education: Not on file    Highest education level: Not on file   Occupational History    Not on file   Social Needs    Financial resource strain: Not on file    Food insecurity     Worry: Not on file     Inability: Not on file    Transportation needs     Medical: Not on file     Non-medical: Not on file   Tobacco Use    Smoking status: Never Smoker    Smokeless tobacco: Never Used   Substance and Sexual Activity    Alcohol use: No     Alcohol/week: 0.0 standard drinks    Drug use: No    Sexual activity: Yes     Partners: Male   Lifestyle    Physical activity     Days per week: Not on file     Minutes per session: Not on file    Stress: Not on file   Relationships    Social connections     Talks on phone: Not on file     Gets together: Not on file     Attends Church service: Not on file     Active member of club or organization: Not on file     Attends meetings of clubs or organizations: Not on file     Relationship status: Not on file   [facility] Intimate partner violence     Fear of current or ex partner: Not on file     Emotionally abused: Not on file     Physically abused: Not on file     Forced sexual activity: Not on file   Other Topics Concern    Not on file   Social History Narrative    Not on file                ALLERGIES: Patient has no known allergies. Review of Systems   Constitutional: Negative for chills. HENT: Positive for sore throat. Negative for congestion, postnasal drip, sinus pressure and sinus pain. Eyes: Negative for redness. Respiratory: Positive for cough. Negative for shortness of breath and wheezing. Cardiovascular: Negative for chest pain. All other systems reviewed and are negative. Vitals:    03/30/20 1249   Pulse: 69   Resp: 16   Temp: 97.9 °F (36.6 °C)   SpO2: 99%       Physical Exam  Vitals signs and nursing note reviewed. Constitutional:       Appearance: She is not ill-appearing or toxic-appearing. HENT:      Mouth/Throat:      Pharynx: No oropharyngeal exudate or posterior oropharyngeal erythema. Pulmonary:      Effort: Pulmonary effort is normal. No respiratory distress. Breath sounds: Normal breath sounds. Neurological:      Mental Status: She is alert. MDM    Procedures             ICD-10-CM ICD-9-CM    1. Cough R05 786.2     ? allergic   2. Throat pain R07.0 784.1     ? PND     Medications Ordered Today   Medications    benzonatate (TESSALON) 200 mg capsule     Sig: Take 1 Cap by mouth three (3) times daily as needed for Cough for up to 7 days. Dispense:  21 Cap     Refill:  0    promethazine-dextromethorphan (PROMETHAZINE-DM) 6.25-15 mg/5 mL syrup     Sig: Take 5 mL by mouth nightly as needed for Cough. Dispense:  60 mL     Refill:  0     No results found for any visits on 03/30/20. The patients condition was discussed with the patient and they understand. The patient is to follow up with primary care doctor.   If signs and symptoms become worse the pt is to go to the ER. The patient is to take medications as prescribed.

## 2020-07-02 ENCOUNTER — VIRTUAL VISIT (OUTPATIENT)
Dept: FAMILY MEDICINE CLINIC | Age: 30
End: 2020-07-02

## 2020-07-02 DIAGNOSIS — N92.6 MISSED PERIOD: Primary | ICD-10-CM

## 2020-07-02 DIAGNOSIS — Z3A.08 8 WEEKS GESTATION OF PREGNANCY: ICD-10-CM

## 2020-07-02 DIAGNOSIS — E55.9 VITAMIN D DEFICIENCY: ICD-10-CM

## 2020-07-02 NOTE — PROGRESS NOTES
Spring Rodrigues is a 26 y.o. female who was seen by synchronous (real-time) audio-video technology on 2020 for Back Pain (Lower back pain.) and Other (Positive pregnancy test.)        Assessment & Plan:   Diagnoses and all orders for this visit:    1. Missed period  -     BETA HCG, QT    2. 8 weeks gestation of pregnancy  -     prenatal vit-calcium-iron-fa (PRENATAL PLUS with CALCIUM) 27 mg iron- 1 mg tab; Take 1 Tab by mouth daily.  -     CBC WITH AUTOMATED DIFF  -     TSH REFLEX TO T4    3. Vitamin D deficiency  -     VITAMIN D, 25 HYDROXY        I spent at least 15 minutes on this visit with this established patient. Discussed starting prenatal vitamins  Discussed medicines those are safe and those need to be avoided. Will do blood work    Subjective:     She has being seen today for her first obstetrical visit. This is a planned pregnancy. She is at 7 and 6/7 weeks gestation  Her obstetrical history is significant for IUGR. Relationship with FOB: spouse, living together. Patient does intend to breast feed. Pregnancy history fully reviewed.   Prior to Admission medications    Medication Sig Start Date End Date Taking? Authorizing Provider   prenatal vit-calcium-iron-fa (PRENATAL PLUS with CALCIUM) 27 mg iron- 1 mg tab Take 1 Tab by mouth daily. 20  Yes Gerson Corea MD     Current Outpatient Medications   Medication Sig Dispense Refill    prenatal vit-calcium-iron-fa (PRENATAL PLUS with CALCIUM) 27 mg iron- 1 mg tab Take 1 Tab by mouth daily. 80 Tab 1     No Known Allergies  Past Medical History:   Diagnosis Date    Migraine     since age 11 years     Past Surgical History:   Procedure Laterality Date    HX  SECTION  2016     Family History   Problem Relation Age of Onset    No Known Problems Mother     No Known Problems Father      Social History     Tobacco Use    Smoking status: Never Smoker    Smokeless tobacco: Never Used   Substance Use Topics    Alcohol use:  No Alcohol/week: 0.0 standard drinks       Review of Systems   Constitutional: Negative for chills, fever and malaise/fatigue. HENT: Negative for congestion, ear pain, sore throat and tinnitus. Eyes: Negative for blurred vision, double vision, pain and discharge. Respiratory: Negative for cough, shortness of breath and wheezing. Cardiovascular: Negative for chest pain, palpitations and leg swelling. Gastrointestinal: Negative for abdominal pain, blood in stool, constipation, diarrhea, nausea and vomiting. Genitourinary: Negative for dysuria, frequency, hematuria and urgency. Missed period   Musculoskeletal: Positive for back pain. Negative for joint pain and myalgias. Skin: Negative for rash. Neurological: Negative for dizziness, tremors, seizures and headaches. Endo/Heme/Allergies: Negative for polydipsia. Does not bruise/bleed easily. Psychiatric/Behavioral: Negative for depression and substance abuse. The patient is not nervous/anxious. Objective:   No flowsheet data found.      [INSTRUCTIONS:  \"[x]\" Indicates a positive item  \"[]\" Indicates a negative item  -- DELETE ALL ITEMS NOT EXAMINED]    Constitutional: [x] Appears well-developed and well-nourished [x] No apparent distress      [] Abnormal -     Mental status: [x] Alert and awake  [x] Oriented to person/place/time [x] Able to follow commands    [] Abnormal -     Eyes:   EOM    [x]  Normal    [] Abnormal -   Sclera  [x]  Normal    [] Abnormal -          Discharge [x]  None visible   [] Abnormal -     HENT: [x] Normocephalic, atraumatic  [] Abnormal -   [x] Mouth/Throat: Mucous membranes are moist    External Ears [x] Normal  [] Abnormal -    Neck: [x] No visualized mass [] Abnormal -     Pulmonary/Chest: [x] Respiratory effort normal   [x] No visualized signs of difficulty breathing or respiratory distress        [] Abnormal -      Musculoskeletal:   [x] Normal gait with no signs of ataxia         [x] Normal range of motion of neck        [] Abnormal -     Neurological:        [x] No Facial Asymmetry (Cranial nerve 7 motor function) (limited exam due to video visit)          [x] No gaze palsy        [] Abnormal -          Skin:        [x] No significant exanthematous lesions or discoloration noted on facial skin         [] Abnormal -            Psychiatric:       [x] Normal Affect [] Abnormal -        [x] No Hallucinations    Other pertinent observable physical exam findings:-        We discussed the expected course, resolution and complications of the diagnosis(es) in detail. Medication risks, benefits, costs, interactions, and alternatives were discussed as indicated. I advised her to contact the office if her condition worsens, changes or fails to improve as anticipated. She expressed understanding with the diagnosis(es) and plan. Neil Alves, who was evaluated through a patient-initiated, synchronous (real-time) audio-video encounter, and/or her healthcare decision maker, is aware that it is a billable service, with coverage as determined by her insurance carrier. She provided verbal consent to proceed: Yes, and patient identification was verified. It was conducted pursuant to the emergency declaration under the [address] [place] [address], [address] [place] [address] authority and the [employer] General Act. A caregiver was present when appropriate. Ability to conduct physical exam was limited. I was in the office. The patient was at home.       Shon Mace MD

## 2020-07-17 LAB
25(OH)D3+25(OH)D2 SERPL-MCNC: 30.4 NG/ML (ref 30–100)
BASOPHILS # BLD AUTO: 0.1 X10E3/UL (ref 0–0.2)
BASOPHILS NFR BLD AUTO: 1 %
EOSINOPHIL # BLD AUTO: 0.2 X10E3/UL (ref 0–0.4)
EOSINOPHIL NFR BLD AUTO: 2 %
ERYTHROCYTE [DISTWIDTH] IN BLOOD BY AUTOMATED COUNT: 13.9 % (ref 11.7–15.4)
HCG INTACT+B SERPL-ACNC: NORMAL MIU/ML
HCT VFR BLD AUTO: 35.4 % (ref 34–46.6)
HGB BLD-MCNC: 11.7 G/DL (ref 11.1–15.9)
IMM GRANULOCYTES # BLD AUTO: 0 X10E3/UL (ref 0–0.1)
IMM GRANULOCYTES NFR BLD AUTO: 0 %
LYMPHOCYTES # BLD AUTO: 1.9 X10E3/UL (ref 0.7–3.1)
LYMPHOCYTES NFR BLD AUTO: 21 %
MCH RBC QN AUTO: 30 PG (ref 26.6–33)
MCHC RBC AUTO-ENTMCNC: 33.1 G/DL (ref 31.5–35.7)
MCV RBC AUTO: 91 FL (ref 79–97)
MONOCYTES # BLD AUTO: 0.6 X10E3/UL (ref 0.1–0.9)
MONOCYTES NFR BLD AUTO: 7 %
NEUTROPHILS # BLD AUTO: 6.4 X10E3/UL (ref 1.4–7)
NEUTROPHILS NFR BLD AUTO: 69 %
PLATELET # BLD AUTO: 223 X10E3/UL (ref 150–450)
RBC # BLD AUTO: 3.9 X10E6/UL (ref 3.77–5.28)
TSH SERPL DL<=0.005 MIU/L-ACNC: 0.63 UIU/ML (ref 0.45–4.5)
WBC # BLD AUTO: 9.2 X10E3/UL (ref 3.4–10.8)

## 2020-07-17 NOTE — PROGRESS NOTES
Please inform patient,  Her blood count, thyroid,vitamin d results very normal  HCG level confirms 15-16 weeks of pregnancy.  She needs to contact her Ob for routine care  thanks

## 2020-07-17 NOTE — PROGRESS NOTES
Advised patient  of recent lab results name and  verified. Advised  that she is 15-16 weeks pregnant and to do follow up routine care with Candis agudelo.

## 2020-08-19 ENCOUNTER — EMPLOYEE WELLNESS (OUTPATIENT)
Dept: OTHER | Facility: CLINIC | Age: 30
End: 2020-08-19

## 2020-08-19 LAB
CHOLEST SERPL-MCNC: 162 MG/DL
GLUCOSE SERPL-MCNC: 74 MG/DL (ref 65–100)
HDLC SERPL-MCNC: 67 MG/DL
LDLC SERPL CALC-MCNC: 65.6 MG/DL (ref 0–100)
TRIGL SERPL-MCNC: 147 MG/DL (ref ?–150)

## 2020-10-21 ENCOUNTER — NURSE TRIAGE (OUTPATIENT)
Dept: OTHER | Facility: CLINIC | Age: 30
End: 2020-10-21

## 2020-10-21 NOTE — TELEPHONE ENCOUNTER
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 Reason for Disposition   [1] COVID-19 infection suspected by caller or triager AND [2] mild symptoms (cough, fever, or others) AND [5] no complications or SOB    Answer Assessment - Initial Assessment Questions  1. COVID-19 DIAGNOSIS: \"Who made your Coronavirus (COVID-19) diagnosis? \" \"Was it confirmed by a positive lab test?\" If not diagnosed by a HCP, ask \"Are there lots of cases (community spread) where you live? \" (See public health department website, if unsure)      N/A    2. ONSET: \"When did the COVID-19 symptoms start? \"       3 days    3. WORST SYMPTOM: \"What is your worst symptom? \" (e.g., cough, fever, shortness of breath, muscle aches)      Headache    4. COUGH: \"Do you have a cough? \" If so, ask: \"How bad is the cough? \"        No    5. FEVER: \"Do you have a fever? \" If so, ask: \"What is your temperature, how was it measured, and when did it start? \"      No    6. RESPIRATORY STATUS: \"Describe your breathing? \" (e.g., shortness of breath, wheezing, unable to speak)       Fine    7. BETTER-SAME-WORSE: Irais Peer you getting better, staying the same or getting worse compared to yesterday? \"  If getting worse, ask, \"In what way? \"      Same    8. HIGH RISK DISEASE: \"Do you have any chronic medical problems? \" (e.g., asthma, heart or lung disease, weak immune system, etc.)      No    9. PREGNANCY: \"Is there any chance you are pregnant? \" \"When was your last menstrual period? \"      Yes    10. OTHER SYMPTOMS: \"Do you have any other symptoms? \"  (e.g., chills, fatigue, headache, loss of smell or taste, muscle pain, sore throat)        Sore throat, no appetite    Protocols used: CORONAVIRUS (COVID-19) DIAGNOSED OR SUSPECTED-ADULT-    Informed of disposition. Care advice as documented. Instructed to call back with worsening symptoms. Caller verbalized understanding and denies any further questions/concerns.     Attention provider: Your patient utilized nurse triage services offered by an employer, payer or community. This encounter includes an overview of the reason for call, assessment and recommended disposition. Please do not respond through this encounter as the response is not directed to a shared pool.

## 2020-10-29 ENCOUNTER — APPOINTMENT (OUTPATIENT)
Dept: GENERAL RADIOLOGY | Age: 30
DRG: 831 | End: 2020-10-29
Attending: EMERGENCY MEDICINE
Payer: COMMERCIAL

## 2020-10-29 ENCOUNTER — HOSPITAL ENCOUNTER (INPATIENT)
Age: 30
LOS: 6 days | Discharge: HOME | DRG: 831 | End: 2020-11-04
Attending: EMERGENCY MEDICINE | Admitting: OBSTETRICS & GYNECOLOGY
Payer: COMMERCIAL

## 2020-10-29 DIAGNOSIS — U07.1 COVID-19: Primary | ICD-10-CM

## 2020-10-29 DIAGNOSIS — J18.9 PNEUMONIA OF BOTH LUNGS DUE TO INFECTIOUS ORGANISM, UNSPECIFIED PART OF LUNG: ICD-10-CM

## 2020-10-29 PROBLEM — O98.513 COVID-19 AFFECTING PREGNANCY IN THIRD TRIMESTER: Status: ACTIVE | Noted: 2020-10-29

## 2020-10-29 LAB
ABO + RH BLD: NORMAL
ALBUMIN SERPL-MCNC: 2.3 G/DL (ref 3.5–5)
ALBUMIN/GLOB SERPL: 0.6 {RATIO} (ref 1.1–2.2)
ALP SERPL-CCNC: 91 U/L (ref 45–117)
ALT SERPL-CCNC: 18 U/L (ref 12–78)
ANION GAP SERPL CALC-SCNC: 7 MMOL/L (ref 5–15)
APPEARANCE UR: CLEAR
APTT PPP: 40.4 SEC (ref 22.1–32)
AST SERPL-CCNC: 20 U/L (ref 15–37)
B PERT DNA SPEC QL NAA+PROBE: NOT DETECTED
BACTERIA URNS QL MICRO: NEGATIVE /HPF
BASOPHILS # BLD: 0 K/UL (ref 0–0.1)
BASOPHILS NFR BLD: 0 % (ref 0–1)
BILIRUB SERPL-MCNC: 0.6 MG/DL (ref 0.2–1)
BILIRUB UR QL: NEGATIVE
BLOOD GROUP ANTIBODIES SERPL: NORMAL
BORDETELLA PARAPERTUSSIS PCR, BORPAR: NOT DETECTED
BUN SERPL-MCNC: 2 MG/DL (ref 6–20)
BUN/CREAT SERPL: 6 (ref 12–20)
C PNEUM DNA SPEC QL NAA+PROBE: NOT DETECTED
CALCIUM SERPL-MCNC: 8 MG/DL (ref 8.5–10.1)
CHLORIDE SERPL-SCNC: 109 MMOL/L (ref 97–108)
CO2 SERPL-SCNC: 20 MMOL/L (ref 21–32)
COLOR UR: ABNORMAL
COMMENT, HOLDF: NORMAL
CREAT SERPL-MCNC: 0.32 MG/DL (ref 0.55–1.02)
DIFFERENTIAL METHOD BLD: ABNORMAL
EOSINOPHIL # BLD: 0 K/UL (ref 0–0.4)
EOSINOPHIL NFR BLD: 0 % (ref 0–7)
EPITH CASTS URNS QL MICRO: ABNORMAL /LPF
ERYTHROCYTE [DISTWIDTH] IN BLOOD BY AUTOMATED COUNT: 14.5 % (ref 11.5–14.5)
FLUAV H1 2009 PAND RNA SPEC QL NAA+PROBE: NOT DETECTED
FLUAV H1 RNA SPEC QL NAA+PROBE: NOT DETECTED
FLUAV H3 RNA SPEC QL NAA+PROBE: NOT DETECTED
FLUAV SUBTYP SPEC NAA+PROBE: NOT DETECTED
FLUBV RNA SPEC QL NAA+PROBE: NOT DETECTED
GLOBULIN SER CALC-MCNC: 4.1 G/DL (ref 2–4)
GLUCOSE SERPL-MCNC: 117 MG/DL (ref 65–100)
GLUCOSE UR STRIP.AUTO-MCNC: NEGATIVE MG/DL
HADV DNA SPEC QL NAA+PROBE: NOT DETECTED
HCG SERPL-ACNC: ABNORMAL MIU/ML (ref 0–6)
HCOV 229E RNA SPEC QL NAA+PROBE: NOT DETECTED
HCOV HKU1 RNA SPEC QL NAA+PROBE: NOT DETECTED
HCOV NL63 RNA SPEC QL NAA+PROBE: NOT DETECTED
HCOV OC43 RNA SPEC QL NAA+PROBE: NOT DETECTED
HCT VFR BLD AUTO: 28.8 % (ref 35–47)
HGB BLD-MCNC: 9.7 G/DL (ref 11.5–16)
HGB UR QL STRIP: NEGATIVE
HMPV RNA SPEC QL NAA+PROBE: NOT DETECTED
HPIV1 RNA SPEC QL NAA+PROBE: NOT DETECTED
HPIV2 RNA SPEC QL NAA+PROBE: NOT DETECTED
HPIV3 RNA SPEC QL NAA+PROBE: NOT DETECTED
HPIV4 RNA SPEC QL NAA+PROBE: NOT DETECTED
HYALINE CASTS URNS QL MICRO: ABNORMAL /LPF (ref 0–5)
IMM GRANULOCYTES # BLD AUTO: 0.1 K/UL (ref 0–0.04)
IMM GRANULOCYTES NFR BLD AUTO: 1 % (ref 0–0.5)
INR PPP: 0.9 (ref 0.9–1.1)
KETONES UR QL STRIP.AUTO: ABNORMAL MG/DL
LACTATE SERPL-SCNC: 0.8 MMOL/L (ref 0.4–2)
LEUKOCYTE ESTERASE UR QL STRIP.AUTO: ABNORMAL
LYMPHOCYTES # BLD: 1.1 K/UL (ref 0.8–3.5)
LYMPHOCYTES NFR BLD: 21 % (ref 12–49)
M PNEUMO DNA SPEC QL NAA+PROBE: NOT DETECTED
MAGNESIUM SERPL-MCNC: 2.1 MG/DL (ref 1.6–2.4)
MCH RBC QN AUTO: 30.3 PG (ref 26–34)
MCHC RBC AUTO-ENTMCNC: 33.7 G/DL (ref 30–36.5)
MCV RBC AUTO: 90 FL (ref 80–99)
MONOCYTES # BLD: 0.3 K/UL (ref 0–1)
MONOCYTES NFR BLD: 7 % (ref 5–13)
NEUTS SEG # BLD: 3.5 K/UL (ref 1.8–8)
NEUTS SEG NFR BLD: 71 % (ref 32–75)
NITRITE UR QL STRIP.AUTO: NEGATIVE
NRBC # BLD: 0 K/UL (ref 0–0.01)
NRBC BLD-RTO: 0 PER 100 WBC
PH UR STRIP: 8 [PH] (ref 5–8)
PLATELET # BLD AUTO: 179 K/UL (ref 150–400)
PMV BLD AUTO: 11.3 FL (ref 8.9–12.9)
POTASSIUM SERPL-SCNC: 2.9 MMOL/L (ref 3.5–5.1)
PROT SERPL-MCNC: 6.4 G/DL (ref 6.4–8.2)
PROT UR STRIP-MCNC: NEGATIVE MG/DL
PROTHROMBIN TIME: 9.8 SEC (ref 9–11.1)
RBC # BLD AUTO: 3.2 M/UL (ref 3.8–5.2)
RBC #/AREA URNS HPF: ABNORMAL /HPF (ref 0–5)
RSV RNA SPEC QL NAA+PROBE: NOT DETECTED
RV+EV RNA SPEC QL NAA+PROBE: NOT DETECTED
SAMPLES BEING HELD,HOLD: NORMAL
SODIUM SERPL-SCNC: 136 MMOL/L (ref 136–145)
SP GR UR REFRACTOMETRY: <1.005 (ref 1–1.03)
SPECIMEN EXP DATE BLD: NORMAL
THERAPEUTIC RANGE,PTTT: ABNORMAL SECS (ref 58–77)
UROBILINOGEN UR QL STRIP.AUTO: 1 EU/DL (ref 0.2–1)
WBC # BLD AUTO: 5 K/UL (ref 3.6–11)
WBC URNS QL MICRO: ABNORMAL /HPF (ref 0–4)

## 2020-10-29 PROCEDURE — 83735 ASSAY OF MAGNESIUM: CPT

## 2020-10-29 PROCEDURE — 87635 SARS-COV-2 COVID-19 AMP PRB: CPT

## 2020-10-29 PROCEDURE — 0100U RESPIRATORY PANEL,PCR,NASOPHARYNGEAL: CPT

## 2020-10-29 PROCEDURE — 86900 BLOOD TYPING SEROLOGIC ABO: CPT

## 2020-10-29 PROCEDURE — 74011250636 HC RX REV CODE- 250/636: Performed by: INTERNAL MEDICINE

## 2020-10-29 PROCEDURE — 84702 CHORIONIC GONADOTROPIN TEST: CPT

## 2020-10-29 PROCEDURE — 81001 URINALYSIS AUTO W/SCOPE: CPT

## 2020-10-29 PROCEDURE — 36415 COLL VENOUS BLD VENIPUNCTURE: CPT

## 2020-10-29 PROCEDURE — 85610 PROTHROMBIN TIME: CPT

## 2020-10-29 PROCEDURE — 85730 THROMBOPLASTIN TIME PARTIAL: CPT

## 2020-10-29 PROCEDURE — 74011250636 HC RX REV CODE- 250/636: Performed by: EMERGENCY MEDICINE

## 2020-10-29 PROCEDURE — 74011000258 HC RX REV CODE- 258: Performed by: INTERNAL MEDICINE

## 2020-10-29 PROCEDURE — 71045 X-RAY EXAM CHEST 1 VIEW: CPT

## 2020-10-29 PROCEDURE — 96361 HYDRATE IV INFUSION ADD-ON: CPT

## 2020-10-29 PROCEDURE — 96374 THER/PROPH/DIAG INJ IV PUSH: CPT

## 2020-10-29 PROCEDURE — 74011250637 HC RX REV CODE- 250/637: Performed by: INTERNAL MEDICINE

## 2020-10-29 PROCEDURE — 80053 COMPREHEN METABOLIC PANEL: CPT

## 2020-10-29 PROCEDURE — 65660000000 HC RM CCU STEPDOWN

## 2020-10-29 PROCEDURE — 99285 EMERGENCY DEPT VISIT HI MDM: CPT

## 2020-10-29 PROCEDURE — 85025 COMPLETE CBC W/AUTO DIFF WBC: CPT

## 2020-10-29 PROCEDURE — 83605 ASSAY OF LACTIC ACID: CPT

## 2020-10-29 PROCEDURE — 74011250637 HC RX REV CODE- 250/637: Performed by: EMERGENCY MEDICINE

## 2020-10-29 RX ORDER — ASCORBIC ACID 500 MG
500 TABLET ORAL 2 TIMES DAILY
Status: DISCONTINUED | OUTPATIENT
Start: 2020-10-29 | End: 2020-11-04 | Stop reason: HOSPADM

## 2020-10-29 RX ORDER — ZINC SULFATE 50(220)MG
1 CAPSULE ORAL DAILY
Status: DISCONTINUED | OUTPATIENT
Start: 2020-10-30 | End: 2020-11-04 | Stop reason: HOSPADM

## 2020-10-29 RX ORDER — POTASSIUM CHLORIDE 750 MG/1
30 TABLET, FILM COATED, EXTENDED RELEASE ORAL
Status: COMPLETED | OUTPATIENT
Start: 2020-10-29 | End: 2020-10-29

## 2020-10-29 RX ORDER — POTASSIUM CHLORIDE 7.45 MG/ML
10 INJECTION INTRAVENOUS
Status: COMPLETED | OUTPATIENT
Start: 2020-10-29 | End: 2020-10-29

## 2020-10-29 RX ORDER — ENOXAPARIN SODIUM 100 MG/ML
30 INJECTION SUBCUTANEOUS EVERY 12 HOURS
Status: DISCONTINUED | OUTPATIENT
Start: 2020-10-29 | End: 2020-11-04 | Stop reason: HOSPADM

## 2020-10-29 RX ORDER — ACETAMINOPHEN 325 MG/1
650 TABLET ORAL ONCE
Status: COMPLETED | OUTPATIENT
Start: 2020-10-29 | End: 2020-10-29

## 2020-10-29 RX ADMIN — ACETAMINOPHEN 650 MG: 325 TABLET ORAL at 15:25

## 2020-10-29 RX ADMIN — SODIUM CHLORIDE 1000 ML: 900 INJECTION, SOLUTION INTRAVENOUS at 16:56

## 2020-10-29 RX ADMIN — SODIUM CHLORIDE 80 ML/HR: 900 INJECTION, SOLUTION INTRAVENOUS at 19:27

## 2020-10-29 RX ADMIN — CEFTRIAXONE SODIUM 1 G: 1 INJECTION, POWDER, FOR SOLUTION INTRAMUSCULAR; INTRAVENOUS at 19:19

## 2020-10-29 RX ADMIN — OXYCODONE HYDROCHLORIDE AND ACETAMINOPHEN 500 MG: 500 TABLET ORAL at 19:20

## 2020-10-29 RX ADMIN — SODIUM CHLORIDE 1000 ML: 900 INJECTION, SOLUTION INTRAVENOUS at 15:25

## 2020-10-29 RX ADMIN — AZITHROMYCIN 500 MG: 500 INJECTION, POWDER, LYOPHILIZED, FOR SOLUTION INTRAVENOUS at 20:52

## 2020-10-29 RX ADMIN — ENOXAPARIN SODIUM 30 MG: 30 INJECTION SUBCUTANEOUS at 22:25

## 2020-10-29 RX ADMIN — POTASSIUM CHLORIDE 30 MEQ: 750 TABLET, FILM COATED, EXTENDED RELEASE ORAL at 16:56

## 2020-10-29 RX ADMIN — POTASSIUM CHLORIDE 10 MEQ: 10 INJECTION, SOLUTION INTRAVENOUS at 16:37

## 2020-10-29 NOTE — ED TRIAGE NOTES
Patient comes to the ER via EMS c/o cough and SOB. Tested COVID+ last week.      25 weeks pregnant- reports lower abdominal pain when she coughs

## 2020-10-29 NOTE — PROGRESS NOTES
Admission Medication Reconciliation:    Information obtained from:  Medical record  RxQuery data available¹:  YES    Comments/Recommendations: Updated PTA meds/reviewed patient's allergies. 1)  Medications reviewed    2)  Medication changes (since last review):              - None      ¹RxQuery pharmacy benefit data reflects medications filled and processed through the patient's insurance, however   this data does NOT capture whether the medication was picked up or is currently being taken by the patient. Allergies:  Patient has no known allergies. Significant PMH/Disease States:   Past Medical History:   Diagnosis Date    COVID-19 virus detected     Migraine     since age 11 years     Chief Complaint for this Admission:    Chief Complaint   Patient presents with    Shortness of Breath    Concern For COVID-19 (Coronavirus)     Prior to Admission Medications:   Prior to Admission Medications   Prescriptions Last Dose Informant Taking?   prenatal vit-calcium-iron-fa (PRENATAL PLUS with CALCIUM) 27 mg iron- 1 mg tab   Yes   Sig: Take 1 Tab by mouth daily. Facility-Administered Medications: None       Please contact the main inpatient pharmacy with any questions or concerns at [phone] and we will direct you to the clinical pharmacist covering this patient's care while in-house.    Kathy Zepeda, PHARMD

## 2020-10-29 NOTE — CONSULTS
Briefly Ms. Landon is a 27 yo  @ 25 weeks with COVID pneumonia. Her primary OB is Dr. Yayo Dubose at [facility]. No obstetric complaints. Positive fetal heart tones obtained via ultrasound by the ED physician Dr Abdirizak Perera. Per agreement with medicine hospitalist service the patient will be admitted under the [facility] service and the medicine team will manage all aspects related to Juan Ramon, we will follow for any obstetric issues. I asked Dr. Abdirizak Perera to consult medicine hospitalist team. I will see her once she gets settled into an inpatient room.

## 2020-10-29 NOTE — H&P
History & Physical    Name: Sal Briceño MRN: [id number]  SSN: [id number]    YOB: 1990  Age: 26 y.o. Sex: female      Chief complaint: shortness of breath     Reason for Admission:  Pregnancy and COVID pneumonia    History of Present Illness: Ms. Gypsy Mora is a 26 y.o.  @ 25w3d who presented to the ED with shortness of breath. She tested positive for COVID 19 on 10/23/20. She was initially asymptomatic but has now developed symptoms. Chest x-ray shows atypical pneumonia so she is being admitted. No obstetric complaints, reports good fetal movement. Uncomplicated pregnancy and health history. Prior  birth and unsure re TOLAC. OB History    Para Term  AB Living   1             SAB TAB Ectopic Molar Multiple Live Births                    # Outcome Date GA Lbr Lee/2nd Weight Sex Delivery Anes PTL Lv   1 Current              Past Medical History:   Diagnosis Date    COVID-19 virus detected     Migraine     since age 11 years     Past Surgical History:   Procedure Laterality Date    HX  SECTION  2016     Social History     Occupational History    Not on file   Tobacco Use    Smoking status: Never Smoker    Smokeless tobacco: Never Used   Substance and Sexual Activity    Alcohol use: No     Alcohol/week: 0.0 standard drinks    Drug use: No    Sexual activity: Yes     Partners: Male      Family History   Problem Relation Age of Onset    No Known Problems Mother     No Known Problems Father        No Known Allergies  Prior to Admission medications    Medication Sig Start Date End Date Taking? Authorizing Provider   prenatal vit-calcium-iron-fa (PRENATAL PLUS with CALCIUM) 27 mg iron- 1 mg tab Take 1 Tab by mouth daily. 20  Yes Benjy Winters MD        Review of Systems:  A comprehensive review of systems was negative except for that written in the History of Present Illness.      Objective:     Vitals:    Vitals:    10/29/20 1537 10/29/20 1657 10/29/20 1700 10/29/20 1905   BP: (!) 91/53 (!) 94/55 (!) 91/48 95/64   Pulse: 96 86 90 82   Resp: (!) 38 29 (!) 34 25   Temp: 100.1 °F (37.8 °C) 98.3 °F (36.8 °C)  98.4 °F (36.9 °C)   SpO2: 95% 98% 97% 96%   Weight:    56.2 kg (124 lb)   Height:    4' 10\" (1.473 m)      Temp (24hrs), Av.1 °F (37.3 °C), Min:98.3 °F (36.8 °C), Max:100.1 °F (37.8 °C)    BP  Min: 91/48  Max: 95/64     Physical Exam:  Patient looks slightly tachypneac  Lungs: normal respiratory effort  Heart: regular rate and rythym   Membranes:  Intact  Uterine Activity:  None pre patient report  Fetal Heart Rate:  140s by ED physician ultrasound      Lab/Data Review:  Recent Results (from the past 24 hour(s))   SAMPLES BEING HELD    Collection Time: 10/29/20  3:26 PM   Result Value Ref Range    SAMPLES BEING HELD 1RED,1UC     COMMENT        Add-on orders for these samples will be processed based on acceptable specimen integrity and analyte stability, which may vary by analyte. CBC WITH AUTOMATED DIFF    Collection Time: 10/29/20  3:26 PM   Result Value Ref Range    WBC 5.0 3.6 - 11.0 K/uL    RBC 3.20 (L) 3.80 - 5.20 M/uL    HGB 9.7 (L) 11.5 - 16.0 g/dL    HCT 28.8 (L) 35.0 - 47.0 %    MCV 90.0 80.0 - 99.0 FL    MCH 30.3 26.0 - 34.0 PG    MCHC 33.7 30.0 - 36.5 g/dL    RDW 14.5 11.5 - 14.5 %    PLATELET 296 818 - 806 K/uL    MPV 11.3 8.9 - 12.9 FL    NRBC 0.0 0  WBC    ABSOLUTE NRBC 0.00 0.00 - 0.01 K/uL    NEUTROPHILS 71 32 - 75 %    LYMPHOCYTES 21 12 - 49 %    MONOCYTES 7 5 - 13 %    EOSINOPHILS 0 0 - 7 %    BASOPHILS 0 0 - 1 %    IMMATURE GRANULOCYTES 1 (H) 0.0 - 0.5 %    ABS. NEUTROPHILS 3.5 1.8 - 8.0 K/UL    ABS. LYMPHOCYTES 1.1 0.8 - 3.5 K/UL    ABS. MONOCYTES 0.3 0.0 - 1.0 K/UL    ABS. EOSINOPHILS 0.0 0.0 - 0.4 K/UL    ABS. BASOPHILS 0.0 0.0 - 0.1 K/UL    ABS. IMM.  GRANS. 0.1 (H) 0.00 - 0.04 K/UL    DF AUTOMATED     METABOLIC PANEL, COMPREHENSIVE    Collection Time: 10/29/20  3:26 PM   Result Value Ref Range    Sodium 136 136 - 145 mmol/L Potassium 2.9 (L) 3.5 - 5.1 mmol/L    Chloride 109 (H) 97 - 108 mmol/L    CO2 20 (L) 21 - 32 mmol/L    Anion gap 7 5 - 15 mmol/L    Glucose 117 (H) 65 - 100 mg/dL    BUN 2 (L) 6 - 20 MG/DL    Creatinine 0.32 (L) 0.55 - 1.02 MG/DL    BUN/Creatinine ratio 6 (L) 12 - 20      GFR est AA >60 >60 ml/min/1.73m2    GFR est non-AA >60 >60 ml/min/1.73m2    Calcium 8.0 (L) 8.5 - 10.1 MG/DL    Bilirubin, total 0.6 0.2 - 1.0 MG/DL    ALT (SGPT) 18 12 - 78 U/L    AST (SGOT) 20 15 - 37 U/L    Alk.  phosphatase 91 45 - 117 U/L    Protein, total 6.4 6.4 - 8.2 g/dL    Albumin 2.3 (L) 3.5 - 5.0 g/dL    Globulin 4.1 (H) 2.0 - 4.0 g/dL    A-G Ratio 0.6 (L) 1.1 - 2.2     BETA HCG, QT    Collection Time: 10/29/20  3:26 PM   Result Value Ref Range    Beta HCG, QT 13,438 (H) 0 - 6 MIU/ML   TYPE & SCREEN    Collection Time: 10/29/20  3:26 PM   Result Value Ref Range    Crossmatch Expiration 11/01/2020,2359     ABO/Rh(D) [place] Raul POSITIVE     Antibody screen NEG    URINALYSIS W/ RFLX MICROSCOPIC    Collection Time: 10/29/20  3:26 PM   Result Value Ref Range    Color YELLOW/STRAW      Appearance CLEAR CLEAR      Specific gravity <1.005 1.003 - 1.030    pH (UA) 8.0 5.0 - 8.0      Protein Negative NEG mg/dL    Glucose Negative NEG mg/dL    Ketone TRACE (A) NEG mg/dL    Bilirubin Negative NEG      Blood Negative NEG      Urobilinogen 1.0 0.2 - 1.0 EU/dL    Nitrites Negative NEG      Leukocyte Esterase TRACE (A) NEG      WBC 0-4 0 - 4 /hpf    RBC 0-5 0 - 5 /hpf    Epithelial cells MODERATE (A) FEW /lpf    Bacteria Negative NEG /hpf    Hyaline cast 0-2 0 - 5 /lpf   MAGNESIUM    Collection Time: 10/29/20  3:26 PM   Result Value Ref Range    Magnesium 2.1 1.6 - 2.4 mg/dL   PROTHROMBIN TIME + INR    Collection Time: 10/29/20  3:26 PM   Result Value Ref Range    INR 0.9 0.9 - 1.1      Prothrombin time 9.8 9.0 - 11.1 sec   PTT    Collection Time: 10/29/20  3:26 PM   Result Value Ref Range    aPTT 40.4 (H) 22.1 - 32.0 sec    aPTT, therapeutic range     58.0 - 77.0 SECS   LACTIC ACID    Collection Time: 10/29/20  3:26 PM   Result Value Ref Range    Lactic acid 0.8 0.4 - 2.0 MMOL/L     Chest x-ray    FINDINGS: A portable AP radiograph of the chest was obtained at 1549 hours. The  patient is on a cardiac monitor. There are scattered small pulmonary opacities  in both lungs. .  Heart size is borderline in size. .  The bones and soft tissues  are grossly within normal limits.      IMPRESSION  IMPRESSION: There is suspicion of several scattered pulmonary opacities  bilaterally may represent atypical pneumonia.       Assessment and Plan: Active Problems:    COVID-19 affecting pregnancy in third trimester (10/29/2020)       25 yo  @ 25 weeks with COVID pneumonia, no obstetric issues  Admit to [facility] service. NST daily. MFM consult. Prior , unsure re TOLAC. Medicine hospitalist service managing pneumonia. Will get prenatal records from Dr. Zack Pinon office tomorrow. For OB questions called dedicated hospitalist phone at [phone].

## 2020-10-29 NOTE — CONSULTS
Consult \    Date of service 10/29/2020     CC:   Sob/fever     HPI: pt w + covid status as per note:  \"26year-old female G2, P1 at 25 weeks presents with complaints of 1 day cough, pleuritic chest pain, shortness of breath. Patient reports that she tested positive for COVID-19 on October 23, 2020. At that time she had no symptoms. Her father-in-law had recently tested positive for coronavirus\" pt also notes of the 6 people in her house hold all + covid except her daughter 2 yrs age is neg,  Pt is a non smoker, no alcohol. Pt deneis other symptoms, no rash, no n/v, no neck pain. Prior to Admission Med List  Prior to Admission Medications   Prescriptions Last Dose Informant Patient Reported? Taking?   prenatal vit-calcium-iron-fa (PRENATAL PLUS with CALCIUM) 27 mg iron- 1 mg tab   No Yes   Sig: Take 1 Tab by mouth daily.       Facility-Administered Medications: None       SH:  Social History     Socioeconomic History    Marital status:      Spouse name: Not on file    Number of children: Not on file    Years of education: Not on file    Highest education level: Not on file   Occupational History    Not on file   Social Needs    Financial resource strain: Not on file    Food insecurity     Worry: Not on file     Inability: Not on file    Transportation needs     Medical: Not on file     Non-medical: Not on file   Tobacco Use    Smoking status: Never Smoker    Smokeless tobacco: Never Used   Substance and Sexual Activity    Alcohol use: No     Alcohol/week: 0.0 standard drinks    Drug use: No    Sexual activity: Yes     Partners: Male   Lifestyle    Physical activity     Days per week: Not on file     Minutes per session: Not on file    Stress: Not on file   Relationships    Social connections     Talks on phone: Not on file     Gets together: Not on file     Attends Evangelical service: Not on file     Active member of club or organization: Not on file     Attends meetings of clubs or organizations: Not on file     Relationship status: Not on file    Intimate partner violence     Fear of current or ex partner: Not on file     Emotionally abused: Not on file     Physically abused: Not on file     Forced sexual activity: Not on file   Other Topics Concern    Not on file   Social History Narrative    Not on file          PMH:     Past Medical History:   Diagnosis Date    COVID-19 virus detected     Migraine     since age 11 years        Medicine PTA:     (Not in a hospital admission)        ROS:   Review of Systems   Constitutional: Positive for fever and malaise/fatigue. HENT: Negative. Eyes: Negative. Respiratory: Positive for cough and shortness of breath. Negative for hemoptysis. Cardiovascular: Negative. Gastrointestinal: Negative. Genitourinary: Negative. Musculoskeletal: Negative. Neurological: Negative. PE:     Visit Vitals  BP (!) 91/48   Pulse 90   Temp 98.3 °F (36.8 °C)   Resp (!) 34   LMP 05/08/2020   SpO2 97%        Physical Exam  Constitutional:       Appearance: She is ill-appearing and diaphoretic. HENT:      Head: Atraumatic. Right Ear: External ear normal.      Left Ear: External ear normal.      Mouth/Throat:      Mouth: Mucous membranes are dry. Eyes:      Extraocular Movements: Extraocular movements intact. Conjunctiva/sclera: Conjunctivae normal.      Pupils: Pupils are equal, round, and reactive to light. Cardiovascular:      Rate and Rhythm: Normal rate. Abdominal:      Comments: C/c w 25 wkks preg, deferred to primary team    Skin:     General: Skin is warm. Neurological:      General: No focal deficit present. Mental Status: She is alert and oriented to person, place, and time.    Psychiatric:         Mood and Affect: Mood normal.         Behavior: Behavior normal.            Data:   Lab Results   Component Value Date/Time    WBC 5.0 10/29/2020 03:26 PM    HGB 9.7 (L) 10/29/2020 03:26 PM    HCT 28.8 (L) 10/29/2020 03:26 PM    PLATELET 615 41/13/1842 03:26 PM    MCV 90.0 10/29/2020 03:26 PM        Lab Results   Component Value Date/Time    Sodium 136 10/29/2020 03:26 PM    Potassium 2.9 (L) 10/29/2020 03:26 PM    Chloride 109 (H) 10/29/2020 03:26 PM    CO2 20 (L) 10/29/2020 03:26 PM    Anion gap 7 10/29/2020 03:26 PM    Glucose 117 (H) 10/29/2020 03:26 PM    BUN 2 (L) 10/29/2020 03:26 PM    Creatinine 0.32 (L) 10/29/2020 03:26 PM    BUN/Creatinine ratio 6 (L) 10/29/2020 03:26 PM    GFR est AA >60 10/29/2020 03:26 PM    GFR est non-AA >60 10/29/2020 03:26 PM    Calcium 8.0 (L) 10/29/2020 03:26 PM    Bilirubin, total 0.6 10/29/2020 03:26 PM    Alk. phosphatase 91 10/29/2020 03:26 PM    Protein, total 6.4 10/29/2020 03:26 PM    Albumin 2.3 (L) 10/29/2020 03:26 PM    Globulin 4.1 (H) 10/29/2020 03:26 PM    A-G Ratio 0.6 (L) 10/29/2020 03:26 PM    ALT (SGPT) 18 10/29/2020 03:26 PM        EKG Results     None        XR Results (most recent):  Results from Hospital Encounter encounter on 10/29/20   XR CHEST PORT    Narrative EXAM:  XR CHEST PORT    INDICATION:  SOB    COMPARISON:  None. FINDINGS: A portable AP radiograph of the chest was obtained at 1549 hours. The  patient is on a cardiac monitor. There are scattered small pulmonary opacities  in both lungs. .  Heart size is borderline in size. .  The bones and soft tissues  are grossly within normal limits. Impression IMPRESSION: There is suspicion of several scattered pulmonary opacities  bilaterally may represent atypical pneumonia.                   Assessment/Plan:     · Atyipcal pna, in setting of covid positive status, pending re-study; some increased wob, + CXR, fever, will consult both pul med and id , start lovenox/zithomax,rocephin/ vit c/zinc,   · 25 wk  IUP -- per primary team       Ursula Montenegro MD 10/29/2020

## 2020-10-29 NOTE — PROGRESS NOTES
10/29/20 1905   Vital Signs   Temp 98.4 °F (36.9 °C)   Temp Source Oral   Pulse (Heart Rate) 82   Heart Rate Source Monitor   Resp Rate 25   O2 Sat (%) 96 %   Level of Consciousness Alert   BP 95/64   MAP (Calculated) 74   BP 1 Method Automatic   BP 1 Location Left arm   BP Patient Position Sitting   MEWS Score 3   Oxygen Therapy   O2 Device Room air   patients vitals improving.   Will continue to monitor

## 2020-10-29 NOTE — PROGRESS NOTES
TRANSFER - IN REPORT:    Verbal report received from Ewa (name) on Shorty Santos  being received from ED (unit) for Progression of care       Report consisted of patients Situation, Background, Assessment and   Recommendations(SBAR). Information from the following report(s) SBAR and Kardex was reviewed with the receiving nurse. Opportunity for questions and clarification was provided. Assessment completed upon patients arrival to unit and care assumed.

## 2020-10-29 NOTE — ED NOTES
Dr Governor Carrasco at bedside evaluating patient. Patient will be upgraded to a higher level of care.

## 2020-10-29 NOTE — ED PROVIDER NOTES
33-year-old female G2, P1 at 25 weeks presents with complaints of 1 day cough, pleuritic chest pain, shortness of breath. Patient reports that she tested positive for COVID-19 on 2020. At that time she had no symptoms. Her father-in-law had recently tested positive for coronavirus. Denies any past medical history. Patient reports T-max of 100.4 on 2020, no fevers today. Positive fetal movement. Denies bleeding. Patient also reports pelvic pain with cough. Denies cramps or contractions. Patient reports normal p.o., urine output. Denies any other past medical history. Denies history of DVT/PE. Denies leg swelling. No other complaints.     OBVigneshwar      Denies tobacco use, drug use, alcohol use           Past Medical History:   Diagnosis Date    COVID-19 virus detected     Migraine     since age 11 years       Past Surgical History:   Procedure Laterality Date    HX  SECTION  2016         Family History:   Problem Relation Age of Onset    No Known Problems Mother     No Known Problems Father        Social History     Socioeconomic History    Marital status:      Spouse name: Not on file    Number of children: Not on file    Years of education: Not on file    Highest education level: Not on file   Occupational History    Not on file   Social Needs    Financial resource strain: Not on file    Food insecurity     Worry: Not on file     Inability: Not on file    Transportation needs     Medical: Not on file     Non-medical: Not on file   Tobacco Use    Smoking status: Never Smoker    Smokeless tobacco: Never Used   Substance and Sexual Activity    Alcohol use: No     Alcohol/week: 0.0 standard drinks    Drug use: No    Sexual activity: Yes     Partners: Male   Lifestyle    Physical activity     Days per week: Not on file     Minutes per session: Not on file    Stress: Not on file   Relationships    Social connections     Talks on phone: Not on file     Gets together: Not on file     Attends [employer] service: Not on file     Active member of club or organization: Not on file     Attends meetings of clubs or organizations: Not on file     Relationship status: Not on file    Intimate partner violence     Fear of current or ex partner: Not on file     Emotionally abused: Not on file     Physically abused: Not on file     Forced sexual activity: Not on file   Other Topics Concern    Not on file   Social History Narrative    Not on file         ALLERGIES: Patient has no known allergies. Review of Systems   Constitutional: Negative for chills and fever. HENT: Negative for congestion, nosebleeds and rhinorrhea. Eyes: Negative for pain and redness. Respiratory: Positive for cough and shortness of breath. Cardiovascular: Positive for chest pain. Negative for palpitations. Gastrointestinal: Negative for abdominal pain, nausea and vomiting. Genitourinary: Positive for pelvic pain. Negative for dysuria, frequency, vaginal bleeding and vaginal pain. Musculoskeletal: Negative for myalgias. Skin: Negative for rash and wound. Neurological: Negative for seizures, syncope and weakness. Hematological: Does not bruise/bleed easily. Psychiatric/Behavioral: Negative for agitation, confusion, dysphoric mood and suicidal ideas. The patient is not nervous/anxious. Vitals:    10/29/20 1508   BP: (!) 91/53   Pulse: 100   Resp: (!) 40   Temp: 99.7 °F (37.6 °C)   SpO2: 97%            Physical Exam  Vitals signs and nursing note reviewed. Constitutional:       Appearance: She is well-developed. HENT:      Head: Normocephalic and atraumatic. Eyes:      Pupils: Pupils are equal, round, and reactive to light. Neck:      Musculoskeletal: Normal range of motion and neck supple. Trachea: No tracheal deviation. Cardiovascular:      Rate and Rhythm: Normal rate and regular rhythm. Heart sounds: Normal heart sounds.    Pulmonary: Effort: Pulmonary effort is normal. Tachypnea present. No respiratory distress. Breath sounds: Normal breath sounds. No stridor. No wheezing or rales. Chest:      Chest wall: No tenderness. Abdominal:      General: Bowel sounds are normal. There is no distension. Palpations: Abdomen is soft. Tenderness: There is no abdominal tenderness. There is no rebound. Comments: gravid   Musculoskeletal: Normal range of motion. General: No tenderness. Skin:     General: Skin is warm and dry. Coloration: Skin is not pale. Findings: No rash. Neurological:      Mental Status: She is alert and oriented to person, place, and time. Cranial Nerves: No cranial nerve deficit. MDM  Number of Diagnoses or Management Options  COVID-19:   Pneumonia of both lungs due to infectious organism, unspecified part of lung:   Diagnosis management comments: 25-year-old female with no medical history  at 25 weeks presents with cough, chest pain, shortness of breath x1 day. Known COVID-19 positive. Patient is tachypneic, afebrile, nontoxic, normal oxygen saturation on room air, speaking complete sentences, answering questions appropriately, gravid belly with normal bowel sounds. Bedside ultrasound shows fetal hearts of 146 and measures 25 weeks and 3 days BPD. Plan-IV fluid hydration, CBC/CMP/UA/beta-hCG, chest x-ray. WBC 5 with no shift  Chest x-ray shows concern for atypical pneumonia with patchy infiltrates. Amount and/or Complexity of Data Reviewed  Clinical lab tests: ordered and reviewed  Tests in the radiology section of CPT®: ordered and reviewed  Discuss the patient with other providers: yes  Independent visualization of images, tracings, or specimens: yes    Patient Progress  Patient progress: stable         Procedures      3:58 PM  Re-eval  Patient is well-appearing, no acute distress, hemodynamically stable, continued tachypnea. Speaking complete sentences. Normal room air oxygen saturation. Bedside ultrasound shows 25-week 3-day IUP with fetal heart of 146. Discussed results with patient. Patient agreeable with plan for admission. [facility] to determine best placement for admission. Discussed with pts OB Dr Alisia Mitchell and agreeflavio wioth plan for admission here for pneumonia.     5:07 PM  Jeane Holloway MD spoke with Dr. Amy Duncan for Ob. Discussed available diagnostic tests and clinical findings. He/She is in agreement with care plans as outlined. He/she will admit patient. Requests medicine consult for COVID/pna management. No anibiotics ordered by me at this time, given known covid + and atypical pneumonia and pregnancy. Will defer decision of antibiotics to hospitalist and patient decision. Roxana Stinson MD spoke with Dr. Tabatha Hudson, Consult for hospitalist. Discussed available diagnostic tests and clinical findings. He/She is in agreement with care plans as outlined. He/she will evaluate and consult for medical management of pneumonia.

## 2020-10-30 LAB
ANION GAP SERPL CALC-SCNC: 10 MMOL/L (ref 5–15)
BASOPHILS # BLD: 0 K/UL (ref 0–0.1)
BASOPHILS NFR BLD: 0 % (ref 0–1)
BNP SERPL-MCNC: 224 PG/ML
BUN SERPL-MCNC: 2 MG/DL (ref 6–20)
BUN/CREAT SERPL: 6 (ref 12–20)
CALCIUM SERPL-MCNC: 7.8 MG/DL (ref 8.5–10.1)
CHLORIDE SERPL-SCNC: 108 MMOL/L (ref 97–108)
CO2 SERPL-SCNC: 18 MMOL/L (ref 21–32)
CREAT SERPL-MCNC: 0.31 MG/DL (ref 0.55–1.02)
CRP SERPL-MCNC: 4.58 MG/DL (ref 0–0.6)
D DIMER PPP FEU-MCNC: 0.95 MG/L FEU (ref 0–0.65)
DIFFERENTIAL METHOD BLD: ABNORMAL
EOSINOPHIL # BLD: 0 K/UL (ref 0–0.4)
EOSINOPHIL NFR BLD: 0 % (ref 0–7)
ERYTHROCYTE [DISTWIDTH] IN BLOOD BY AUTOMATED COUNT: 14.6 % (ref 11.5–14.5)
FERRITIN SERPL-MCNC: 15 NG/ML (ref 8–252)
GLUCOSE SERPL-MCNC: 120 MG/DL (ref 65–100)
HCT VFR BLD AUTO: 27.5 % (ref 35–47)
HEALTH STATUS, XMCV2T: ABNORMAL
HGB BLD-MCNC: 9.1 G/DL (ref 11.5–16)
IMM GRANULOCYTES # BLD AUTO: 0.1 K/UL (ref 0–0.04)
IMM GRANULOCYTES NFR BLD AUTO: 2 % (ref 0–0.5)
LYMPHOCYTES # BLD: 1.1 K/UL (ref 0.8–3.5)
LYMPHOCYTES NFR BLD: 18 % (ref 12–49)
MCH RBC QN AUTO: 30.1 PG (ref 26–34)
MCHC RBC AUTO-ENTMCNC: 33.1 G/DL (ref 30–36.5)
MCV RBC AUTO: 91.1 FL (ref 80–99)
MONOCYTES # BLD: 0.3 K/UL (ref 0–1)
MONOCYTES NFR BLD: 5 % (ref 5–13)
NEUTS SEG # BLD: 4.4 K/UL (ref 1.8–8)
NEUTS SEG NFR BLD: 75 % (ref 32–75)
NRBC # BLD: 0 K/UL (ref 0–0.01)
NRBC BLD-RTO: 0 PER 100 WBC
PLATELET # BLD AUTO: 170 K/UL (ref 150–400)
PMV BLD AUTO: 11.5 FL (ref 8.9–12.9)
POTASSIUM SERPL-SCNC: 3.1 MMOL/L (ref 3.5–5.1)
PROCALCITONIN SERPL-MCNC: 0.07 NG/ML
RBC # BLD AUTO: 3.02 M/UL (ref 3.8–5.2)
SARS-COV-2, COV2: DETECTED
SODIUM SERPL-SCNC: 136 MMOL/L (ref 136–145)
SOURCE, COVRS: ABNORMAL
SPECIMEN SOURCE, FCOV2M: ABNORMAL
SPECIMEN TYPE, XMCV1T: ABNORMAL
WBC # BLD AUTO: 5.9 K/UL (ref 3.6–11)

## 2020-10-30 PROCEDURE — 74011000258 HC RX REV CODE- 258: Performed by: INTERNAL MEDICINE

## 2020-10-30 PROCEDURE — 36415 COLL VENOUS BLD VENIPUNCTURE: CPT

## 2020-10-30 PROCEDURE — 85025 COMPLETE CBC W/AUTO DIFF WBC: CPT

## 2020-10-30 PROCEDURE — 87899 AGENT NOS ASSAY W/OPTIC: CPT

## 2020-10-30 PROCEDURE — 83880 ASSAY OF NATRIURETIC PEPTIDE: CPT

## 2020-10-30 PROCEDURE — 85379 FIBRIN DEGRADATION QUANT: CPT

## 2020-10-30 PROCEDURE — 82728 ASSAY OF FERRITIN: CPT

## 2020-10-30 PROCEDURE — 3E0333Z INTRODUCTION OF ANTI-INFLAMMATORY INTO PERIPHERAL VEIN, PERCUTANEOUS APPROACH: ICD-10-PCS | Performed by: OBSTETRICS & GYNECOLOGY

## 2020-10-30 PROCEDURE — 87449 NOS EACH ORGANISM AG IA: CPT

## 2020-10-30 PROCEDURE — 80048 BASIC METABOLIC PNL TOTAL CA: CPT

## 2020-10-30 PROCEDURE — 65660000000 HC RM CCU STEPDOWN

## 2020-10-30 PROCEDURE — 74011250636 HC RX REV CODE- 250/636: Performed by: INTERNAL MEDICINE

## 2020-10-30 PROCEDURE — 84145 PROCALCITONIN (PCT): CPT

## 2020-10-30 PROCEDURE — 74011000250 HC RX REV CODE- 250: Performed by: INTERNAL MEDICINE

## 2020-10-30 PROCEDURE — 74011250636 HC RX REV CODE- 250/636: Performed by: NURSE PRACTITIONER

## 2020-10-30 PROCEDURE — 74011250637 HC RX REV CODE- 250/637: Performed by: NURSE PRACTITIONER

## 2020-10-30 PROCEDURE — 83520 IMMUNOASSAY QUANT NOS NONAB: CPT

## 2020-10-30 PROCEDURE — 86140 C-REACTIVE PROTEIN: CPT

## 2020-10-30 PROCEDURE — 74011250637 HC RX REV CODE- 250/637: Performed by: INTERNAL MEDICINE

## 2020-10-30 PROCEDURE — XW033E5 INTRODUCTION OF REMDESIVIR ANTI-INFECTIVE INTO PERIPHERAL VEIN, PERCUTANEOUS APPROACH, NEW TECHNOLOGY GROUP 5: ICD-10-PCS | Performed by: OBSTETRICS & GYNECOLOGY

## 2020-10-30 PROCEDURE — 59025 FETAL NON-STRESS TEST: CPT

## 2020-10-30 RX ORDER — DEXAMETHASONE SODIUM PHOSPHATE 4 MG/ML
6 INJECTION, SOLUTION INTRA-ARTICULAR; INTRALESIONAL; INTRAMUSCULAR; INTRAVENOUS; SOFT TISSUE EVERY 24 HOURS
Status: DISCONTINUED | OUTPATIENT
Start: 2020-10-30 | End: 2020-11-03 | Stop reason: SDUPTHER

## 2020-10-30 RX ORDER — GUAIFENESIN/DEXTROMETHORPHAN 100-10MG/5
5 SYRUP ORAL ONCE
Status: COMPLETED | OUTPATIENT
Start: 2020-10-30 | End: 2020-10-30

## 2020-10-30 RX ORDER — GUAIFENESIN/DEXTROMETHORPHAN 100-10MG/5
5 SYRUP ORAL
Status: DISCONTINUED | OUTPATIENT
Start: 2020-10-30 | End: 2020-10-31

## 2020-10-30 RX ADMIN — CEFTRIAXONE SODIUM 1 G: 1 INJECTION, POWDER, FOR SOLUTION INTRAMUSCULAR; INTRAVENOUS at 18:12

## 2020-10-30 RX ADMIN — ZINC SULFATE 220 MG (50 MG) CAPSULE 1 CAPSULE: CAPSULE at 08:22

## 2020-10-30 RX ADMIN — AZITHROMYCIN 500 MG: 500 INJECTION, POWDER, LYOPHILIZED, FOR SOLUTION INTRAVENOUS at 18:53

## 2020-10-30 RX ADMIN — ENOXAPARIN SODIUM 30 MG: 30 INJECTION SUBCUTANEOUS at 09:46

## 2020-10-30 RX ADMIN — REMDESIVIR 200 MG: 100 INJECTION, POWDER, LYOPHILIZED, FOR SOLUTION INTRAVENOUS at 12:44

## 2020-10-30 RX ADMIN — OXYCODONE HYDROCHLORIDE AND ACETAMINOPHEN 500 MG: 500 TABLET ORAL at 17:38

## 2020-10-30 RX ADMIN — GUAIFENESIN AND DEXTROMETHORPHAN 5 ML: 100; 10 SYRUP ORAL at 05:41

## 2020-10-30 RX ADMIN — OXYCODONE HYDROCHLORIDE AND ACETAMINOPHEN 500 MG: 500 TABLET ORAL at 08:22

## 2020-10-30 RX ADMIN — DEXAMETHASONE SODIUM PHOSPHATE 6 MG: 4 INJECTION, SOLUTION INTRAMUSCULAR; INTRAVENOUS at 12:37

## 2020-10-30 RX ADMIN — ENOXAPARIN SODIUM 30 MG: 30 INJECTION SUBCUTANEOUS at 22:30

## 2020-10-30 NOTE — PROGRESS NOTES
Notified NP of patients RR 35. Patients 02 sats 96% RA. Placed patient on 2L sats 98% still working hard to breath at 35RR. No new orders at this time.   Will continue to monitor

## 2020-10-30 NOTE — CONSULTS
Infectious Disease Consult    Today's Date: 10/30/2020   Admit Date: 10/29/2020    Impression:   Acute respiratory failure secondary to COVID 19 and PNA  - COVID 19 (+): outside facility    CXR: several scattered pulmonary opacities bilaterally may represent atypical pneumonia. D-dimer 0.95, other inflammatory markers pending    Plan:   · Discussed about the benefits and risks of IV Remdeivir and decadron therapy extensively. Pt agreed to receive both therapy since she felt benefits outweighs the risks. The therapy also discussed with OB, Dr. Dionne Taylor by Dr. Carmela Gottleib. · Start on IV Remdesivir therapy per pharmacy protocol  · Start on IV decadron, vit C, and Zinc  · Complete total 5 days of IV ABX as directed  · Will continue to follow inflammatory markers  · Check legionella and s. pneumo urine    Anti-infectives:   · IV azithromycin 10/29 to present  · IV rocephin 10/29 to present    Subjective:   Date of Consultation:  October 30, 2020  Referring Physician: Dr. Srinivas Morris    Patient is a 26 y.o. female with G2, P1, 25 weeks of gestation was diagnosed with COVID 19 on 10/22/2020, [facility] (phone [phone], fax [phone]) at [facility] [address]. [facility] us. In house COVID 19 testing result is still pending. Pt has been experiencing fever/chills, body aches since 10/21/2020. There are 6 family members in her household, all (+) with Juan Ramon except her brother. NO n/v, diarrhea, or headache at this time. Pt is currently on O2 @ 2L via n/c. Pt has been seen by OB. CXR revealed several scattered pulmonary opacities bilaterally may represent atypical pneumonia. Dry cough with deep inspiration with mild pleuritic pain. No hx of smoking or ETOH consumption. Pt was working as a  at [employer] prior to this admission. ID team was consulted for COVID 19 with PNA.     Patient Active Problem List   Diagnosis Code    Migraine headache G43.909    Failure to gain weight [id number]    Menorrhagia N92.0    Chronic pansinusitis J32.4    Vitamin D deficiency E55.9    COVID-19 affecting pregnancy in third trimester O98.513, U07.1     Past Medical History:   Diagnosis Date    COVID-19 virus detected     Migraine     since age 11 years      Family History   Problem Relation Age of Onset    No Known Problems Mother     No Known Problems Father       Social History     Tobacco Use    Smoking status: Never Smoker    Smokeless tobacco: Never Used   Substance Use Topics    Alcohol use: No     Alcohol/week: 0.0 standard drinks     Past Surgical History:   Procedure Laterality Date    HX  SECTION  2016      Prior to Admission medications    Medication Sig Start Date End Date Taking? Authorizing Provider   prenatal vit-calcium-iron-fa (PRENATAL PLUS with CALCIUM) 27 mg iron- 1 mg tab Take 1 Tab by mouth daily. 20  Yes MD waldo Mcconnell  No Known Allergies     REVIEW OF SYSTEMS:     Total of 12 systems reviewed as follows:   I am not able to complete the review of systems because:    The patient is intubated and sedated    The patient has altered mental status due to his acute medical problems    The patient has baseline aphasia from prior stroke(s)    The patient has baseline dementia and is not reliable historian                 POSITIVE= underlined text  Negative = text not underlined  General:  fever, chills, sweats, generalized weakness, weight loss/gain,      loss of appetite, bodyaches  Eyes:    blurred vision, eye pain, loss of vision, double vision  ENT:    rhinorrhea, pharyngitis   Respiratory:   cough, sputum production, SOB, wheezing, GUZMAN, pleuritic pain   Cardiology:   chest pain, palpitations, orthopnea, PND, edema, syncope   Gastrointestinal:  abdominal pain , N/V, dysphagia, diarrhea, constipation, bleeding   Genitourinary:  frequency, urgency, dysuria, hematuria, incontinence   Muskuloskeletal :  arthralgia, myalgia Hematology:  easy bruising, nose or gum bleeding, lymphadenopathy   Dermatological: rash, ulceration, pruritis   Endocrine:   hot flashes or polydipsia   Neurological:  headache, dizziness, confusion, focal weakness, paresthesia,     Speech difficulties, memory loss, gait disturbance  Psychological: Feelings of anxiety, depression, agitation    Objective:     Visit Vitals  BP (!) 93/59 (BP 1 Location: Right arm, BP Patient Position: At rest)   Pulse 89   Temp 99.1 °F (37.3 °C)   Resp 20   Ht 4' 10\" (1.473 m)   Wt 56.2 kg (124 lb)   SpO2 97%   BMI 25.92 kg/m²     Temp (24hrs), Av.9 °F (37.2 °C), Min:97.7 °F (36.5 °C), Max:100.1 °F (37.8 °C)       PHYSICAL EXAM:   General:    Alert, cooperative, no distress, appears stated age. HEENT: Atraumatic, anicteric sclerae, pink conjunctivae     No oral ulcers, mucosa moist, throat clear  Neck:  Supple, symmetrical,  thyroid: non tender  Lungs:   Clear to auscultation bilaterally. No Wheezing or Rhonchi. No rales. Chest wall:  No tenderness  No Accessory muscle use. Heart:   Regular  rhythm,  No  murmur   No edema  Abdomen:   Soft, non-tender. Not distended. Bowel sounds normal  Extremities: No cyanosis. No clubbing  Skin:     Not pale. Not Jaundiced  No rashes   Psych:  Good insight. Not depressed. Not anxious or agitated. Neurologic: EOMs intact. No facial asymmetry. No aphasia or slurred speech. Symmetrical strength, Alert and oriented X 4.        Data Review:     CBC:  Recent Labs     10/29/20  1526   WBC 5.0   GRANS 71   MONOS 7   EOS 0   ANEU 3.5   ABL 1.1   HGB 9.7*   HCT 28.8*          BMP:  Recent Labs     10/29/20  1526   CREA 0.32*   BUN 2*      K 2.9*   *   CO2 20*   AGAP 7   *       LFTS:  Recent Labs     10/29/20  1526   TBILI 0.6   ALT 18   AP 91   TP 6.4   ALB 2.3*       Microbiology:     All Micro Results     Procedure Component Value Units Date/Time    RESPIRATORY PANEL,PCR,NASOPHARYNGEAL [[id number]] Collected: 10/29/20 1940    Order Status:  Completed Specimen:  Nasopharyngeal Updated:  10/29/20 2331     Adenovirus Not detected        Coronavirus 229E Not detected        Coronavirus HKU1 Not detected        Coronavirus CVNL63 Not detected        Coronavirus OC43 Not detected        Metapneumovirus Not detected        Rhinovirus and Enterovirus Not detected        Influenza A Not detected        Influenza A, subtype H1 Not detected        Influenza A, subtype H3 Not detected        INFLUENZA A H1N1 PCR Not detected        Influenza B Not detected        Parainfluenza 1 Not detected        Parainfluenza 2 Not detected        Parainfluenza 3 Not detected        Parainfluenza virus 4 Not detected        RSV by PCR Not detected        B. parapertussis, PCR Not detected        Bordetella pertussis - PCR Not detected        Chlamydophila pneumoniae DNA, QL, PCR Not detected        Mycoplasma pneumoniae DNA, QL, PCR Not detected             Imaging:   CXR: several scattered pulmonary opacities bilaterally may represent atypical pneumonia.     Signed By: Tressa Velazco NP     October 30, 2020

## 2020-10-30 NOTE — ROUTINE PROCESS
Bedside shift change report given to Farzana Alfredo (oncoming nurse) by Cherylene Hasty (offgoing nurse). Report included the following information SBAR, Kardex, MAR and Recent Results.

## 2020-10-30 NOTE — CONSULTS
Pulmonary    Due to COVID-19 pandemic and in efforts to decrease risk of spread and potential spread of virus as well as to conserve personal protective equipment, direct patient contact is being limited where clinically appropriate. Chart reviewed. All pertinent images, lab studies, and medical testing have been reviewed (images independently viewed). Pulmonary recommendations are being made to the primary medical team.    Pulmonary Impression:        Acute hypoxemic respiratory failure in setting of COVID -19 infection and patchy lung infiltrates c/w viral pneumonia    25 weeks pregnant      Pulmonary Recommendations:   --agree with empiric abx  --trend inflammatory markers  --monitor O2 sats and work of breathing  --low threshold to add decadron  --Ob / gyn following    Available over the weekend if needed        History:           25 yo female 25 weeks pregnant presenting with shortness of breath and is COVID - 19 positive. CXR with patchy asdz  On NC O2 at 2 L/min  On empiric abx with rocephin and azithromycin.   Receiving vitamin C and Zinc.  Tmax 100.1        Vital Signs:       Patient Vitals for the past 4 hrs:   BP Temp Pulse Resp SpO2   10/30/20 0820 (!) 93/58 99.9 °F (37.7 °C) 95 20 91 %     Temp (24hrs), Av °F (37.2 °C), Min:97.7 °F (36.5 °C), Max:100.1 °F (37.8 °C)    CVP:        No intake or output data in the 24 hours ending 10/30/20 0912  Blood Sugar:  No results found for: GLUCPOC    Physical Exam:      Deferred             Data Review:      Radiology images independently viewed  CXR - patchy infiltrates    Labs:  Lab:  Recent Labs     10/29/20  1526   WBC 5.0   HGB 9.7*         K 2.9*   *   CO2 20*   BUN 2*   CREA 0.32*   *   CA 8.0*   MG 2.1   INR 0.9   TBILI 0.6   LAC 0.8          D dimer - 0.95         Vandana Ace MD

## 2020-10-30 NOTE — PROGRESS NOTES
High Risk Obstetrics Progress Note    Name: Edgar Henry MRN: [id number]  SSN: [id number]    YOB: 1990  Age: 26 y.o. Sex: female      Subjective:      LOS: 1 day    Estimated Date of Delivery: None noted. Gestational Age Today: Unknown     Patient's chart has been reviewed, discussed with both primary floor nurse Anabel Moffett) as well as OB nurses performing monitoring but patient was not directly seen in an effort to minimize exposure and spread of illness. Patient admitted for symptomatic Covid in setting of pregnancy. No OB complaints reported from nurses. She did report occ feeling some contractions from every 15 minutes to once an hour but was able to sleep throughout today's monitoring. Objective:     Vitals:  Blood pressure (!) 90/56, pulse 94, temperature 98.5 °F (36.9 °C), resp. rate 20, height 4' 10\" (1.473 m), weight 56.2 kg (124 lb), last menstrual period 2020, SpO2 96 %. Temp (24hrs), Av °F (37.2 °C), Min:97.7 °F (36.5 °C), Max:100.1 °F (95.2 °C)    Systolic (46GWP), QKZ:28 , Min:90 , OZY:77      Diastolic (62VSA), GDY:97, Min:48, Max:64       Intake and Output:     Date 10/30/20 0700 - 10/31/20 0659   Shift 0605-7479 9488-0483 8432-8446 24 Hour Total   INTAKE   P.O. 240   240   Shift Total(mL/kg) 240(4.3)   240(4.3)   OUTPUT   Shift Total(mL/kg)       Weight (kg) 56.2 56.2 56.2 56.2       Physical Exam:  Deferred       Membranes:  Intact    Uterine Activity:  None    Fetal Heart Rate:  Baseline: 140 per minute  Variability: moderate  Accelerations: yes  Decelerations: none        Labs:   Recent Results (from the past 36 hour(s))   SAMPLES BEING HELD    Collection Time: 10/29/20  3:26 PM   Result Value Ref Range    SAMPLES BEING HELD 1RED,1UC     COMMENT        Add-on orders for these samples will be processed based on acceptable specimen integrity and analyte stability, which may vary by analyte.    CBC WITH AUTOMATED DIFF    Collection Time: 10/29/20  3:26 PM   Result Value Ref Range    WBC 5.0 3.6 - 11.0 K/uL    RBC 3.20 (L) 3.80 - 5.20 M/uL    HGB 9.7 (L) 11.5 - 16.0 g/dL    HCT 28.8 (L) 35.0 - 47.0 %    MCV 90.0 80.0 - 99.0 FL    MCH 30.3 26.0 - 34.0 PG    MCHC 33.7 30.0 - 36.5 g/dL    RDW 14.5 11.5 - 14.5 %    PLATELET 898 369 - 128 K/uL    MPV 11.3 8.9 - 12.9 FL    NRBC 0.0 0  WBC    ABSOLUTE NRBC 0.00 0.00 - 0.01 K/uL    NEUTROPHILS 71 32 - 75 %    LYMPHOCYTES 21 12 - 49 %    MONOCYTES 7 5 - 13 %    EOSINOPHILS 0 0 - 7 %    BASOPHILS 0 0 - 1 %    IMMATURE GRANULOCYTES 1 (H) 0.0 - 0.5 %    ABS. NEUTROPHILS 3.5 1.8 - 8.0 K/UL    ABS. LYMPHOCYTES 1.1 0.8 - 3.5 K/UL    ABS. MONOCYTES 0.3 0.0 - 1.0 K/UL    ABS. EOSINOPHILS 0.0 0.0 - 0.4 K/UL    ABS. BASOPHILS 0.0 0.0 - 0.1 K/UL    ABS. IMM. GRANS. 0.1 (H) 0.00 - 0.04 K/UL    DF AUTOMATED     METABOLIC PANEL, COMPREHENSIVE    Collection Time: 10/29/20  3:26 PM   Result Value Ref Range    Sodium 136 136 - 145 mmol/L    Potassium 2.9 (L) 3.5 - 5.1 mmol/L    Chloride 109 (H) 97 - 108 mmol/L    CO2 20 (L) 21 - 32 mmol/L    Anion gap 7 5 - 15 mmol/L    Glucose 117 (H) 65 - 100 mg/dL    BUN 2 (L) 6 - 20 MG/DL    Creatinine 0.32 (L) 0.55 - 1.02 MG/DL    BUN/Creatinine ratio 6 (L) 12 - 20      GFR est AA >60 >60 ml/min/1.73m2    GFR est non-AA >60 >60 ml/min/1.73m2    Calcium 8.0 (L) 8.5 - 10.1 MG/DL    Bilirubin, total 0.6 0.2 - 1.0 MG/DL    ALT (SGPT) 18 12 - 78 U/L    AST (SGOT) 20 15 - 37 U/L    Alk.  phosphatase 91 45 - 117 U/L    Protein, total 6.4 6.4 - 8.2 g/dL    Albumin 2.3 (L) 3.5 - 5.0 g/dL    Globulin 4.1 (H) 2.0 - 4.0 g/dL    A-G Ratio 0.6 (L) 1.1 - 2.2     BETA HCG, QT    Collection Time: 10/29/20  3:26 PM   Result Value Ref Range    Beta HCG, QT 13,438 (H) 0 - 6 MIU/ML   TYPE & SCREEN    Collection Time: 10/29/20  3:26 PM   Result Value Ref Range    Crossmatch Expiration 11/01/2020,2359     ABO/Rh(D) Stephen Vicente POSITIVE     Antibody screen NEG    URINALYSIS W/ RFLX MICROSCOPIC    Collection Time: 10/29/20  3:26 PM   Result Value Ref Range    Color YELLOW/STRAW      Appearance CLEAR CLEAR      Specific gravity <1.005 1.003 - 1.030    pH (UA) 8.0 5.0 - 8.0      Protein Negative NEG mg/dL    Glucose Negative NEG mg/dL    Ketone TRACE (A) NEG mg/dL    Bilirubin Negative NEG      Blood Negative NEG      Urobilinogen 1.0 0.2 - 1.0 EU/dL    Nitrites Negative NEG      Leukocyte Esterase TRACE (A) NEG      WBC 0-4 0 - 4 /hpf    RBC 0-5 0 - 5 /hpf    Epithelial cells MODERATE (A) FEW /lpf    Bacteria Negative NEG /hpf    Hyaline cast 0-2 0 - 5 /lpf   MAGNESIUM    Collection Time: 10/29/20  3:26 PM   Result Value Ref Range    Magnesium 2.1 1.6 - 2.4 mg/dL   PROTHROMBIN TIME + INR    Collection Time: 10/29/20  3:26 PM   Result Value Ref Range    INR 0.9 0.9 - 1.1      Prothrombin time 9.8 9.0 - 11.1 sec   PTT    Collection Time: 10/29/20  3:26 PM   Result Value Ref Range    aPTT 40.4 (H) 22.1 - 32.0 sec    aPTT, therapeutic range     58.0 - 77.0 SECS   LACTIC ACID    Collection Time: 10/29/20  3:26 PM   Result Value Ref Range    Lactic acid 0.8 0.4 - 2.0 MMOL/L   RESPIRATORY PANEL,PCR,NASOPHARYNGEAL    Collection Time: 10/29/20  7:40 PM    Specimen: Nasopharyngeal   Result Value Ref Range    Adenovirus Not detected NOTD      Coronavirus 229E Not detected NOTD      Coronavirus HKU1 Not detected NOTD      Coronavirus CVNL63 Not detected NOTD      Coronavirus OC43 Not detected NOTD      Metapneumovirus Not detected NOTD      Rhinovirus and Enterovirus Not detected NOTD      Influenza A Not detected NOTD      Influenza A, subtype H1 Not detected NOTD      Influenza A, subtype H3 Not detected NOTD      INFLUENZA A H1N1 PCR Not detected NOTD      Influenza B Not detected NOTD      Parainfluenza 1 Not detected NOTD      Parainfluenza 2 Not detected NOTD      Parainfluenza 3 Not detected NOTD      Parainfluenza virus 4 Not detected NOTD      RSV by PCR Not detected NOTD      B. parapertussis, PCR Not detected NOTD      Bordetella pertussis - PCR Not detected NOTD      Chlamydophila pneumoniae DNA, QL, PCR Not detected NOTD      Mycoplasma pneumoniae DNA, QL, PCR Not detected NOTD     SARS-COV-2    Collection Time: 10/29/20  7:42 PM   Result Value Ref Range    Specimen source Nasopharyngeal      SARS-CoV-2 PENDING     Specimen source NP SWAB     Specimen type NP Swab      Health status Symptomatic Testing     D DIMER    Collection Time: 10/30/20  2:56 AM   Result Value Ref Range    D-dimer 0.95 (H) 0.00 - 0.65 mg/L FEU   METABOLIC PANEL, BASIC    Collection Time: 10/30/20  9:59 AM   Result Value Ref Range    Sodium 136 136 - 145 mmol/L    Potassium 3.1 (L) 3.5 - 5.1 mmol/L    Chloride 108 97 - 108 mmol/L    CO2 18 (L) 21 - 32 mmol/L    Anion gap 10 5 - 15 mmol/L    Glucose 120 (H) 65 - 100 mg/dL    BUN 2 (L) 6 - 20 MG/DL    Creatinine 0.31 (L) 0.55 - 1.02 MG/DL    BUN/Creatinine ratio 6 (L) 12 - 20      GFR est AA >60 >60 ml/min/1.73m2    GFR est non-AA >60 >60 ml/min/1.73m2    Calcium 7.8 (L) 8.5 - 10.1 MG/DL   C REACTIVE PROTEIN, QT    Collection Time: 10/30/20  9:59 AM   Result Value Ref Range    C-Reactive protein 4.58 (H) 0.00 - 0.60 mg/dL   FERRITIN    Collection Time: 10/30/20  9:59 AM   Result Value Ref Range    Ferritin 15 8 - 252 NG/ML   PROCALCITONIN    Collection Time: 10/30/20  9:59 AM   Result Value Ref Range    Procalcitonin 0.07 ng/mL   NT-PRO BNP    Collection Time: 10/30/20  9:59 AM   Result Value Ref Range    NT pro- (H) <125 PG/ML   CBC WITH AUTOMATED DIFF    Collection Time: 10/30/20 10:15 AM   Result Value Ref Range    WBC 5.9 3.6 - 11.0 K/uL    RBC 3.02 (L) 3.80 - 5.20 M/uL    HGB 9.1 (L) 11.5 - 16.0 g/dL    HCT 27.5 (L) 35.0 - 47.0 %    MCV 91.1 80.0 - 99.0 FL    MCH 30.1 26.0 - 34.0 PG    MCHC 33.1 30.0 - 36.5 g/dL    RDW 14.6 (H) 11.5 - 14.5 %    PLATELET 873 469 - 740 K/uL    MPV 11.5 8.9 - 12.9 FL    NRBC 0.0 0  WBC    ABSOLUTE NRBC 0.00 0.00 - 0.01 K/uL    NEUTROPHILS 75 32 - 75 %    LYMPHOCYTES 18 12 - 49 % MONOCYTES 5 5 - 13 %    EOSINOPHILS 0 0 - 7 %    BASOPHILS 0 0 - 1 %    IMMATURE GRANULOCYTES 2 (H) 0.0 - 0.5 %    ABS. NEUTROPHILS 4.4 1.8 - 8.0 K/UL    ABS. LYMPHOCYTES 1.1 0.8 - 3.5 K/UL    ABS. MONOCYTES 0.3 0.0 - 1.0 K/UL    ABS. EOSINOPHILS 0.0 0.0 - 0.4 K/UL    ABS. BASOPHILS 0.0 0.0 - 0.1 K/UL    ABS. IMM. GRANS. 0.1 (H) 0.00 - 0.04 K/UL    DF AUTOMATED         Assessment and Plan:       Active Problems:    COVID-19 affecting pregnancy in third trimester (10/29/2020)       Pregnancy and Covid 19 positive with symptoms   --Continue management per ID/Pulm/Medicine teams  --Patient has been started on Remdesivir  & Dexamethasone today  --She continues on empiric Azithromycin and Rocephin  --Prenatal records are being requested from her primary OB's office  --MFM has been consulted     Signed By: Chai Rojas MD     October 30, 2020

## 2020-10-30 NOTE — PROGRESS NOTES
Patient resting in bed quietly. Problem: Airway Clearance - Ineffective  Goal: Achieve or maintain patent airway  Outcome: Progressing Towards Goal     Problem: Gas Exchange - Impaired  Goal: Absence of hypoxia  Outcome: Progressing Towards Goal  Goal: Promote optimal lung function  Outcome: Progressing Towards Goal     Problem: Breathing Pattern - Ineffective  Goal: Ability to achieve and maintain a regular respiratory rate  Outcome: Progressing Towards Goal     Problem:  Body Temperature -  Risk of, Imbalanced  Goal: Ability to maintain a body temperature within defined limits  Outcome: Progressing Towards Goal  Goal: Will regain or maintain usual level of consciousness  Outcome: Progressing Towards Goal  Goal: Complications related to the disease process, condition or treatment will be avoided or minimized  Outcome: Progressing Towards Goal     Problem: Isolation Precautions - Risk of Spread of Infection  Goal: Prevent transmission of infectious organism to others  Outcome: Progressing Towards Goal     Problem: Nutrition Deficits  Goal: Optimize nutrtional status  Outcome: Progressing Towards Goal     Problem: Risk for Fluid Volume Deficit  Goal: Maintain normal heart rhythm  Outcome: Progressing Towards Goal  Goal: Maintain absence of muscle cramping  Outcome: Progressing Towards Goal  Goal: Maintain normal serum potassium, sodium, calcium, phosphorus, and pH  Outcome: Progressing Towards Goal     Problem: Loneliness or Risk for Loneliness  Goal: Demonstrate positive use of time alone when socialization is not possible  Outcome: Progressing Towards Goal     Problem: Fatigue  Goal: Verbalize increase energy and improved vitality  Outcome: Progressing Towards Goal     Problem: Patient Education: Go to Patient Education Activity  Goal: Patient/Family Education  Outcome: Progressing Towards Goal     Problem: Falls - Risk of  Goal: *Absence of Falls  Description: Document Angel Fall Risk and appropriate interventions in the flowsheet.   Outcome: Progressing Towards Goal  Note: Fall Risk Interventions:                                Problem: Patient Education: Go to Patient Education Activity  Goal: Patient/Family Education  Outcome: Progressing Towards Goal     Problem: Pain  Goal: *Control of Pain  Outcome: Progressing Towards Goal  Goal: *PALLIATIVE CARE:  Alleviation of Pain  Outcome: Progressing Towards Goal     Problem: Patient Education: Go to Patient Education Activity  Goal: Patient/Family Education  Outcome: Progressing Towards Goal

## 2020-10-30 NOTE — PROGRESS NOTES
Problem: Airway Clearance - Ineffective  Goal: Achieve or maintain patent airway  Outcome: Progressing Towards Goal     Problem: Gas Exchange - Impaired  Goal: Absence of hypoxia  Outcome: Progressing Towards Goal  Goal: Promote optimal lung function  Outcome: Progressing Towards Goal     Problem: Breathing Pattern - Ineffective  Goal: Ability to achieve and maintain a regular respiratory rate  Outcome: Progressing Towards Goal     Problem: Body Temperature -  Risk of, Imbalanced  Goal: Ability to maintain a body temperature within defined limits  Outcome: Progressing Towards Goal  Goal: Will regain or maintain usual level of consciousness  Outcome: Progressing Towards Goal  Goal: Complications related to the disease process, condition or treatment will be avoided or minimized  Outcome: Progressing Towards Goal     Problem: Isolation Precautions - Risk of Spread of Infection  Goal: Prevent transmission of infectious organism to others  Outcome: Progressing Towards Goal     Problem: Nutrition Deficits  Goal: Optimize nutrtional status  Outcome: Progressing Towards Goal     Problem: Risk for Fluid Volume Deficit  Goal: Maintain normal heart rhythm  Outcome: Progressing Towards Goal  Goal: Maintain absence of muscle cramping  Outcome: Progressing Towards Goal  Goal: Maintain normal serum potassium, sodium, calcium, phosphorus, and pH  Outcome: Progressing Towards Goal     Problem: Loneliness or Risk for Loneliness  Goal: Demonstrate positive use of time alone when socialization is not possible  Outcome: Progressing Towards Goal     Problem: Fatigue  Goal: Verbalize increase energy and improved vitality  Outcome: Progressing Towards Goal     Problem: Patient Education: Go to Patient Education Activity  Goal: Patient/Family Education  Outcome: Progressing Towards Goal     Problem: Falls - Risk of  Goal: *Absence of Falls  Description: Document Angel Fall Risk and appropriate interventions in the flowsheet.   Outcome: Progressing Towards Goal  Note: Fall Risk Interventions:                                Problem: Patient Education: Go to Patient Education Activity  Goal: Patient/Family Education  Outcome: Progressing Towards Goal     Problem: Pain  Goal: *Control of Pain  Outcome: Progressing Towards Goal  Goal: *PALLIATIVE CARE:  Alleviation of Pain  Outcome: Progressing Towards Goal     Problem: Patient Education: Go to Patient Education Activity  Goal: Patient/Family Education  Outcome: Progressing Towards Goal

## 2020-10-30 NOTE — PROGRESS NOTES
Remdesivir Initiation Note    Kieran Junior meets criteria for initiation of remdesivir under the emergency use authorization (EUA) based on the following:   Known or suspected COVID-19   Severe disease (SpO2 ? 94% on RA, requiring supplemental O2, or requiring invasive mechanical ventilation)   Acceptable renal function   CrCl ? 30 ml/min based on SCr obtained prior to initiation    Acceptable hepatic function (ALT within 5 times ULN)    I have discussed with the patient/proxy information consistent with the Fact Sheet for Patients and Parents/Caregivers\" and the patient/proxy has agreed to initiating remdesivir after being:   Given the Fact Sheet for Patients and Parents/Caregivers   Informed of the alternatives to receiving remdesivir, and    Informed that remdesivir is an unapproved drug that is authorized for use under EUA    Liver function tests will be monitored daily while on remdesivir.

## 2020-10-30 NOTE — PROGRESS NOTES
Bedside shift change report given to [facility] and Naman RN (oncoming nurse) by Vinayak Rider (offgoing nurse). Report included the following information SBAR, Kardex, MAR and Recent Results.

## 2020-10-30 NOTE — PROGRESS NOTES
[facility] Adult  Hospitalist Group                                                                                          Hospitalist Progress Note  Jordan Sanchez MD  Answering service: [phone] from in house phone        Date of Service:  10/30/2020  NAME:  Luis Eduardo Ivan  :  1990  MRN:  [id number]      Please note that this dictation was completed with [employer], the computer voice recognition software. Quite often unanticipated grammatical, syntax, homophones, and other interpretive errors are inadvertently transcribed by the computer software. Please disregard these errors. Please excuse any errors that have escaped final proofreading. Admission Summary:   pt w + covid status as per note:  \"26year-old female G2, P1 at 25 weeks presents with complaints of 1 day cough, pleuritic chest pain, shortness of breath.  Patient reports that she tested positive for COVID-19 on 2020.  At that time she had no symptoms. Vito Babin father-in-law had recently tested positive for coronavirus\" pt also notes of the 6 people in her house hold all + covid except her daughter 2 yrs age is neg,  Pt is a non smoker, no alcohol. Pt deneis other symptoms, no rash, no n/v, no neck pain. Interval history / Subjective:   Patient seen and examined today. She was getting NST at the time  Did not appear shortness of breath to me currently on room air  She looked very anxious and did not engage in much conversation with me  She was concentrating room on her NST     Assessment & Plan:     Covid positive pneumonia  Started on remdesivir and the Decadron  ID and pulmonology on board. On empiric antibiotics ceftriaxone and azithromycin  Lovenox per protocol. Zinc sulfate and vitamin C per protocol.   Daily Covid labs Legionella and strep pneumoniae antigen test    25-week pregnancy  OB on board  Daily NST  FM consult  Rest per OB          Code status: full  DVT prophylaxis:     Care Plan discussed with: Patient/Family  Anticipated Disposition: Home w/Family  Anticipated Discharge: Greater than 48 hours     Hospital Problems  Date Reviewed: 1/9/2019          Codes Class Noted POA    COVID-19 affecting pregnancy in third trimester ICD-10-CM: O98.513, U07.1  ICD-9-CM: 647.63, 079.89  10/29/2020 Unknown                Review of Systems:   A comprehensive review of systems was negative except for that written in the HPI. Vital Signs:    Last 24hrs VS reviewed since prior progress note. Most recent are:  Visit Vitals  BP (!) 90/56 (BP 1 Location: Left arm, BP Patient Position: Sitting)   Pulse 94   Temp 98.5 °F (36.9 °C)   Resp 20   Ht 4' 10\" (1.473 m)   Wt 56.2 kg (124 lb)   SpO2 96%   BMI 25.92 kg/m²         Intake/Output Summary (Last 24 hours) at 10/30/2020 1615  Last data filed at 10/30/2020 1244  Gross per 24 hour   Intake 240 ml   Output 300 ml   Net -60 ml        Physical Examination:             Constitutional:  No acute distress, looks comfortable   ENT:  Oral mucosa moist,    Resp:  No acute respiratory distress. Room air   CV:  Regular rhythm,     GI:  Distended and firm    Musculoskeletal:  No edema,     Neurologic:  Moves all extremities. Data Review:    Review and/or order of clinical lab test      Labs:     Recent Labs     10/30/20  1015 10/29/20  1526   WBC 5.9 5.0   HGB 9.1* 9.7*   HCT 27.5* 28.8*    179     Recent Labs     10/30/20  0959 10/29/20  1526    136   K 3.1* 2.9*    109*   CO2 18* 20*   BUN 2* 2*   CREA 0.31* 0.32*   * 117*   CA 7.8* 8.0*   MG  --  2.1     Recent Labs     10/29/20  1526   ALT 18   AP 91   TBILI 0.6   TP 6.4   ALB 2.3*   GLOB 4.1*     Recent Labs     10/29/20  1526   INR 0.9   PTP 9.8   APTT 40.4*      Recent Labs     10/30/20  0959   FERR 15      No results found for: FOL, RBCF   No results for input(s): PH, PCO2, PO2 in the last 72 hours. No results for input(s): CPK, CKNDX, TROIQ in the last 72 hours.     No lab exists for component: CPKMB  Lab Results   Component Value Date/Time    Cholesterol, total 162 08/19/2020 09:57 AM    HDL Cholesterol 67 08/19/2020 09:57 AM    LDL, calculated 65.6 08/19/2020 09:57 AM    Triglyceride 147 08/19/2020 09:57 AM     No results found for: Sallie Gray  Lab Results   Component Value Date/Time    Color YELLOW/STRAW 10/29/2020 03:26 PM    Appearance CLEAR 10/29/2020 03:26 PM    Specific gravity <1.005 10/29/2020 03:26 PM    pH (UA) 8.0 10/29/2020 03:26 PM    Protein Negative 10/29/2020 03:26 PM    Glucose Negative 10/29/2020 03:26 PM    Ketone TRACE (A) 10/29/2020 03:26 PM    Bilirubin Negative 10/29/2020 03:26 PM    Urobilinogen 1.0 10/29/2020 03:26 PM    Nitrites Negative 10/29/2020 03:26 PM    Leukocyte Esterase TRACE (A) 10/29/2020 03:26 PM    Epithelial cells MODERATE (A) 10/29/2020 03:26 PM    Bacteria Negative 10/29/2020 03:26 PM    WBC 0-4 10/29/2020 03:26 PM    RBC 0-5 10/29/2020 03:26 PM         Medications Reviewed:     Current Facility-Administered Medications   Medication Dose Route Frequency    dexamethasone (DECADRON) 4 mg/mL injection 6 mg  6 mg IntraVENous Q24H    [START ON 10/31/2020] remdesivir 100 mg in 0.9% sodium chloride 250 mL IVPB  100 mg IntraVENous Q24H    enoxaparin (LOVENOX) injection 30 mg  30 mg SubCUTAneous Q12H    zinc sulfate (ZINCATE) 220 (50) mg capsule 1 Cap  1 Cap Oral DAILY    ascorbic acid (vitamin C) (VITAMIN C) tablet 500 mg  500 mg Oral BID    cefTRIAXone (ROCEPHIN) 1 g in 0.9% sodium chloride (MBP/ADV) 50 mL  1 g IntraVENous Q24H    azithromycin (ZITHROMAX) 500 mg in 0.9% sodium chloride (MBP/ADV) 250 mL  500 mg IntraVENous Q24H     ______________________________________________________________________  EXPECTED LENGTH OF STAY: 3d 7h  ACTUAL LENGTH OF STAY:          1                 Zaynab Atkins MD

## 2020-10-30 NOTE — PROGRESS NOTES
Bedside shift change report given to AIME ERWIN [facility] RN and Umm Salter RN (oncoming nurse) by Loi Gonsalez (offgoing nurse). Report included the following information {SBAR REPORTS [id number]}.

## 2020-10-31 LAB
ALBUMIN SERPL-MCNC: 2.1 G/DL (ref 3.5–5)
ALBUMIN/GLOB SERPL: 0.5 {RATIO} (ref 1.1–2.2)
ALP SERPL-CCNC: 92 U/L (ref 45–117)
ALT SERPL-CCNC: 18 U/L (ref 12–78)
ANION GAP SERPL CALC-SCNC: 8 MMOL/L (ref 5–15)
AST SERPL-CCNC: 23 U/L (ref 15–37)
BASOPHILS # BLD: 0 K/UL (ref 0–0.1)
BASOPHILS NFR BLD: 0 % (ref 0–1)
BILIRUB SERPL-MCNC: 0.4 MG/DL (ref 0.2–1)
BUN SERPL-MCNC: 3 MG/DL (ref 6–20)
BUN/CREAT SERPL: 8 (ref 12–20)
CALCIUM SERPL-MCNC: 7.1 MG/DL (ref 8.5–10.1)
CHLORIDE SERPL-SCNC: 111 MMOL/L (ref 97–108)
CO2 SERPL-SCNC: 19 MMOL/L (ref 21–32)
CREAT SERPL-MCNC: 0.38 MG/DL (ref 0.55–1.02)
D DIMER PPP FEU-MCNC: 0.75 MG/L FEU (ref 0–0.65)
DIFFERENTIAL METHOD BLD: ABNORMAL
EOSINOPHIL # BLD: 0 K/UL (ref 0–0.4)
EOSINOPHIL NFR BLD: 0 % (ref 0–7)
ERYTHROCYTE [DISTWIDTH] IN BLOOD BY AUTOMATED COUNT: 14.6 % (ref 11.5–14.5)
GLOBULIN SER CALC-MCNC: 4.2 G/DL (ref 2–4)
GLUCOSE SERPL-MCNC: 111 MG/DL (ref 65–100)
HCT VFR BLD AUTO: 27.3 % (ref 35–47)
HGB BLD-MCNC: 9.2 G/DL (ref 11.5–16)
IMM GRANULOCYTES # BLD AUTO: 0.1 K/UL (ref 0–0.04)
IMM GRANULOCYTES NFR BLD AUTO: 2 % (ref 0–0.5)
LYMPHOCYTES # BLD: 1.3 K/UL (ref 0.8–3.5)
LYMPHOCYTES NFR BLD: 18 % (ref 12–49)
MCH RBC QN AUTO: 30.7 PG (ref 26–34)
MCHC RBC AUTO-ENTMCNC: 33.7 G/DL (ref 30–36.5)
MCV RBC AUTO: 91 FL (ref 80–99)
MONOCYTES # BLD: 0.3 K/UL (ref 0–1)
MONOCYTES NFR BLD: 5 % (ref 5–13)
NEUTS SEG # BLD: 5.3 K/UL (ref 1.8–8)
NEUTS SEG NFR BLD: 75 % (ref 32–75)
NRBC # BLD: 0 K/UL (ref 0–0.01)
NRBC BLD-RTO: 0 PER 100 WBC
PLATELET # BLD AUTO: 174 K/UL (ref 150–400)
PMV BLD AUTO: 11.1 FL (ref 8.9–12.9)
POTASSIUM SERPL-SCNC: 3 MMOL/L (ref 3.5–5.1)
PROT SERPL-MCNC: 6.3 G/DL (ref 6.4–8.2)
RBC # BLD AUTO: 3 M/UL (ref 3.8–5.2)
SODIUM SERPL-SCNC: 138 MMOL/L (ref 136–145)
WBC # BLD AUTO: 7 K/UL (ref 3.6–11)

## 2020-10-31 PROCEDURE — 65660000000 HC RM CCU STEPDOWN

## 2020-10-31 PROCEDURE — 74011250637 HC RX REV CODE- 250/637: Performed by: INTERNAL MEDICINE

## 2020-10-31 PROCEDURE — 85379 FIBRIN DEGRADATION QUANT: CPT

## 2020-10-31 PROCEDURE — 74011250636 HC RX REV CODE- 250/636: Performed by: NURSE PRACTITIONER

## 2020-10-31 PROCEDURE — 85025 COMPLETE CBC W/AUTO DIFF WBC: CPT

## 2020-10-31 PROCEDURE — 36415 COLL VENOUS BLD VENIPUNCTURE: CPT

## 2020-10-31 PROCEDURE — 74011250636 HC RX REV CODE- 250/636: Performed by: INTERNAL MEDICINE

## 2020-10-31 PROCEDURE — 80053 COMPREHEN METABOLIC PANEL: CPT

## 2020-10-31 PROCEDURE — 74011000258 HC RX REV CODE- 258: Performed by: INTERNAL MEDICINE

## 2020-10-31 PROCEDURE — 74011250637 HC RX REV CODE- 250/637: Performed by: NURSE PRACTITIONER

## 2020-10-31 PROCEDURE — 59025 FETAL NON-STRESS TEST: CPT

## 2020-10-31 PROCEDURE — 74011000250 HC RX REV CODE- 250: Performed by: INTERNAL MEDICINE

## 2020-10-31 RX ORDER — POTASSIUM CHLORIDE 750 MG/1
40 TABLET, FILM COATED, EXTENDED RELEASE ORAL DAILY
Status: DISCONTINUED | OUTPATIENT
Start: 2020-10-31 | End: 2020-11-04 | Stop reason: HOSPADM

## 2020-10-31 RX ORDER — ONDANSETRON 2 MG/ML
4 INJECTION INTRAMUSCULAR; INTRAVENOUS
Status: DISCONTINUED | OUTPATIENT
Start: 2020-10-31 | End: 2020-11-04 | Stop reason: HOSPADM

## 2020-10-31 RX ORDER — GUAIFENESIN/DEXTROMETHORPHAN 100-10MG/5
5 SYRUP ORAL
Status: DISCONTINUED | OUTPATIENT
Start: 2020-10-31 | End: 2020-11-04 | Stop reason: HOSPADM

## 2020-10-31 RX ADMIN — OXYCODONE HYDROCHLORIDE AND ACETAMINOPHEN 500 MG: 500 TABLET ORAL at 09:38

## 2020-10-31 RX ADMIN — POTASSIUM CHLORIDE 40 MEQ: 750 TABLET, FILM COATED, EXTENDED RELEASE ORAL at 12:47

## 2020-10-31 RX ADMIN — REMDESIVIR 100 MG: 100 INJECTION, POWDER, LYOPHILIZED, FOR SOLUTION INTRAVENOUS at 12:47

## 2020-10-31 RX ADMIN — DEXAMETHASONE SODIUM PHOSPHATE 6 MG: 4 INJECTION, SOLUTION INTRAMUSCULAR; INTRAVENOUS at 10:27

## 2020-10-31 RX ADMIN — GUAIFENESIN AND DEXTROMETHORPHAN 5 ML: 100; 10 SYRUP ORAL at 21:43

## 2020-10-31 RX ADMIN — ENOXAPARIN SODIUM 30 MG: 30 INJECTION SUBCUTANEOUS at 09:38

## 2020-10-31 RX ADMIN — OXYCODONE HYDROCHLORIDE AND ACETAMINOPHEN 500 MG: 500 TABLET ORAL at 17:36

## 2020-10-31 RX ADMIN — GUAIFENESIN AND DEXTROMETHORPHAN 5 ML: 100; 10 SYRUP ORAL at 12:47

## 2020-10-31 RX ADMIN — ZINC SULFATE 220 MG (50 MG) CAPSULE 1 CAPSULE: CAPSULE at 09:38

## 2020-10-31 RX ADMIN — ONDANSETRON 4 MG: 2 INJECTION INTRAMUSCULAR; INTRAVENOUS at 12:47

## 2020-10-31 RX ADMIN — GUAIFENESIN AND DEXTROMETHORPHAN 5 ML: 100; 10 SYRUP ORAL at 00:35

## 2020-10-31 RX ADMIN — AZITHROMYCIN 500 MG: 500 INJECTION, POWDER, LYOPHILIZED, FOR SOLUTION INTRAVENOUS at 19:01

## 2020-10-31 RX ADMIN — ENOXAPARIN SODIUM 30 MG: 30 INJECTION SUBCUTANEOUS at 21:43

## 2020-10-31 RX ADMIN — CEFTRIAXONE SODIUM 1 G: 1 INJECTION, POWDER, FOR SOLUTION INTRAMUSCULAR; INTRAVENOUS at 17:36

## 2020-10-31 NOTE — PROGRESS NOTES
1516 External fetal monitor applied. Patient denies leaking of fluid from her vagina or vaginal bleeding. Patient states she is feeling irregular contractions she describes as lower abdominal pressure and is feeling fetal movement. 1616 External fetal monitor off. 3 contractions noted over 1 hour. Patient encouraged to increase PO fluids and to notify nurse if she feels and increase in abdominal tightening, pressure or contractions. Will notify Dr. Rajan Reinoso. 1640 Dr. Rajan Reinoso notified fetal tracing reactive, 3 contractions noted over 1 hour, patient encouraged to increase po fluids and instructed to notify nurse if she has increased abdominal cramping, contractions or pressure.

## 2020-10-31 NOTE — PROGRESS NOTES
Alexander Pittman  Is a 26year old  @ 22 weeks ega with an uncomplicated gestation, except for hx prior  delivery. She is admitted for atypical PNA, +CV19 test on 10/23. Was feeling well initially, but developed sob and increased WOB. NO ob complaints. Reports good fetal activity. Blood pressure (!) 90/57, pulse 87, temperature 98.1 °F (36.7 °C), resp. rate 16, height 4' 10\" (1.473 m), weight 56.2 kg (124 lb), last menstrual period 2020, SpO2 94 %. Room air   Date/Time  Temp  Pulse  Resp  BP  SpO2  O2 Device  O2 Flow Rate (L/min)  Weight  Who    10/31/20 1400  98.1 °F (36.7 °C)  87  16  90/57Abnormal    94 %        PM    10/31/20 0820  98.9 °F (37.2 °C)  98  18  97/58Abnormal    95 %        TV    10/31/20 0308  98.5 °F (36.9 °C)  85  18  87/57Abnormal    95 %        RJ        Exam deferred; chart review consult only for guidance with medication and mgt.      Chest film 10/29:  FINDINGS: A portable AP radiograph of the chest was obtained at 1549 hours. The  patient is on a cardiac monitor. There are scattered small pulmonary opacities  in both lungs. .  Heart size is borderline in size. .  The bones and soft tissues  are grossly within normal limits.      IMPRESSION  IMPRESSION: There is suspicion of several scattered pulmonary opacities  bilaterally may represent atypical pneumonia.       Labs:   Recent Results (from the past 24 hour(s))   D DIMER    Collection Time: 10/31/20 12:41 AM   Result Value Ref Range    D-dimer 0.75 (H) 0.00 - 0.65 mg/L FEU   CBC WITH AUTOMATED DIFF    Collection Time: 10/31/20 12:41 AM   Result Value Ref Range    WBC 7.0 3.6 - 11.0 K/uL    RBC 3.00 (L) 3.80 - 5.20 M/uL    HGB 9.2 (L) 11.5 - 16.0 g/dL    HCT 27.3 (L) 35.0 - 47.0 %    MCV 91.0 80.0 - 99.0 FL    MCH 30.7 26.0 - 34.0 PG    MCHC 33.7 30.0 - 36.5 g/dL    RDW 14.6 (H) 11.5 - 14.5 %    PLATELET 349 116 - 757 K/uL    MPV 11.1 8.9 - 12.9 FL    NRBC 0.0 0  WBC    ABSOLUTE NRBC 0.00 0.00 - 0.01 K/uL NEUTROPHILS 75 32 - 75 %    LYMPHOCYTES 18 12 - 49 %    MONOCYTES 5 5 - 13 %    EOSINOPHILS 0 0 - 7 %    BASOPHILS 0 0 - 1 %    IMMATURE GRANULOCYTES 2 (H) 0.0 - 0.5 %    ABS. NEUTROPHILS 5.3 1.8 - 8.0 K/UL    ABS. LYMPHOCYTES 1.3 0.8 - 3.5 K/UL    ABS. MONOCYTES 0.3 0.0 - 1.0 K/UL    ABS. EOSINOPHILS 0.0 0.0 - 0.4 K/UL    ABS. BASOPHILS 0.0 0.0 - 0.1 K/UL    ABS. IMM. GRANS. 0.1 (H) 0.00 - 0.04 K/UL    DF AUTOMATED     METABOLIC PANEL, COMPREHENSIVE    Collection Time: 10/31/20 12:41 AM   Result Value Ref Range    Sodium 138 136 - 145 mmol/L    Potassium 3.0 (L) 3.5 - 5.1 mmol/L    Chloride 111 (H) 97 - 108 mmol/L    CO2 19 (L) 21 - 32 mmol/L    Anion gap 8 5 - 15 mmol/L    Glucose 111 (H) 65 - 100 mg/dL    BUN 3 (L) 6 - 20 MG/DL    Creatinine 0.38 (L) 0.55 - 1.02 MG/DL    BUN/Creatinine ratio 8 (L) 12 - 20      GFR est AA >60 >60 ml/min/1.73m2    GFR est non-AA >60 >60 ml/min/1.73m2    Calcium 7.1 (L) 8.5 - 10.1 MG/DL    Bilirubin, total 0.4 0.2 - 1.0 MG/DL    ALT (SGPT) 18 12 - 78 U/L    AST (SGOT) 23 15 - 37 U/L    Alk. phosphatase 92 45 - 117 U/L    Protein, total 6.3 (L) 6.4 - 8.2 g/dL    Albumin 2.1 (L) 3.5 - 5.0 g/dL    Globulin 4.2 (H) 2.0 - 4.0 g/dL    A-G Ratio 0.5 (L) 1.1 - 2.2      a/p:   Sharee Wise is a 26year old , hx prior  delivery,  CV19+ with pna, currently on CTX, azithro, remdesivir, decadron, lovenox, vit c, zinc, K repletion. Doing better but still with sob. OB: once daily fetal monitoring, or if patient complaints of increased uterine activity. Please call labor and delivery to evaluate if this is a concern. RESP: Aim to keep sats 94-95% with or without supplemental oxygen. If resp rate >28 bpm, consider augmenting IV hydration due to insensible losses with close monitoring of lung exam for signs of pulmonary edema, Hydration is important to maintain uterine quiescence, and oxygenation with sats  maintains the necessary gradient for oxygen to be offloaded to the fetus.  Hypoxia can also additionally foment increased uterine activity. Meds: Remdesivir can be used safely in pregnancy The Reprotox printout (below) for remdesivir affirms lack of effects in the first trimester. All meds are risk-benefit, and the benefit of an adequately oxygenating mother far outweighs any risk of medication use. Steroids can also be used safely in this situation. Use of steroids in pregnancy can increase glucose intolerance and cause hypertension, but the typical duration of use causes only temporary effects on glycemia lasting for typically only 3 days after last dose administration. Dexamethasone does cross the placenta and in fact it is used in ob for facilitating lung maturation in fetuses at risk for  delivery. If the patient requires pressors for maintaining her blood pressure, using neosynephrine preferably as this will spare the vasoconstriction of the uterine circulation. We recommend using LMWH while hospitalized and for 2-4 weeks following discharge. Following D dimer in pregnancy may not be helpful, as D dimer values are increased in normal pregnancy. Potassium supplementation should be used to maintain physiologic K+ levels for pregnancy: typically 3.5-3.8 is sufficient. Joshua Pryor MD.   Perinatology     Addendum: Remdesivir reprotox. Last Updated 2020   Agent Summary  Quick take: Based on experimental animal studies, remdesivir therapy is not expected to increase congenital malformations. Remdesivir (OI-9882) is marketed as Keita Valcons for treatment of COVID-19 [id number]. Remdesivir is a prodrug the triphosphate of which is an adenosine analog and inhibits RNA polymerase. Pregnancy and breast feeding   Nonclinical studies summarized in the [place] product labeling (2) and FDA summary review (3) showed no adverse effects on embryofetal development in rats and rabbits when the active metabolite was administered at 4 times the human dose.  Administration to lactating rats resulted in transfer of remdesivir or metabolite(s) to the pup but no adverse developmental effects. A  baby whose mother  of Ebola virus infection received remdesivir beginning on day of life 23 (4). The infant was developmentally normal at 3 year of age. We did not locate controlled human studies on possible reproductive or lactation effects of this medication. Reproduction   In the reproductive and development toxicity studies reported by one , at maternally toxic dose levels in rats, there was a decrease in corpora lutea, a consequent decrease in implantation sites and viable embryos, and lower ovary and uterus/cervix/oviduct weights (1,2). No adverse findings were reported in male rats in a fertility study or in developmental toxicity studies in rats and rabbits (1). The active metabolite of remdesivir did not affect fertility in male rats and twice the human exposure level (1,2). Selected references   1.  Medicines Agency. 2020. Summary on compassionate use Remdesivir [place]. https://www.[employer].ProPublica/   2. Smallable product labeling 2020 [employer]. pdf]   3. Summary FDA review 10/21/20 https://www.[employer].ProPublica/. pdf   4. Zoya BHATIA, Gerald RODRÍGUEZ, Kate A, Mine TOMPKINS, Murtaza Johnson, Arron Young, Jeanette Tavera Antierens A. First  baby to receive experimental therapies survives Ebolas virus disease.  I Infect Dis [id number].   Abdoulaye Yoo

## 2020-10-31 NOTE — PROGRESS NOTES
Bedside shift change report given to Ji Grove (oncoming nurse) by Consuelo Yepez RN (offgoing nurse). Report included the following information SBAR, Kardex and MAR.

## 2020-10-31 NOTE — PROGRESS NOTES
[facility]                                                                                          Hospitalist Progress Note  Neisha Amin MD  Answering service: [phone] from in house phone        Date of Service:  10/31/2020  NAME:  Kane Howard  :  1990  MRN:  [id number]      Please note that this dictation was completed with [employer], the [email or web address] voice recognition software. Quite often unanticipated grammatical, syntax, homophones, and other interpretive errors are inadvertently transcribed by the computer software. Please disregard these errors. Please excuse any errors that have escaped final proofreading. Admission Summary:   pt w + covid status as per note:  \"26year-old female G2, P1 at 25 weeks presents with complaints of 1 day cough, pleuritic chest pain, shortness of breath.  Patient reports that she tested positive for COVID-19 on 2020.  At that time she had no symptoms. Fady Nicole father-in-law had recently tested positive for coronavirus\" pt also notes of the 6 people in her house hold all + covid except her daughter 2 yrs age is neg,  Pt is a non smoker, no alcohol. Pt deneis other symptoms, no rash, no n/v, no neck pain. Interval history / Subjective:     PATIENT seen   Better overall , but still has sob   Eating good   No nausea and vomiting     Is soft spoken        Assessment & Plan:     Covid positive pneumonia  Started on remdesivir and the Decadron  ID and pulmonology on board. On empiric antibiotics ceftriaxone and azithromycin  Lovenox per protocol. Zinc sulfate and vitamin C per protocol.   Daily Covid labs Legionella and strep pneumoniae antigen test  Stable labs so far    25-week pregnancy  OB on board  Daily NST  FM consult  Rest per OB          Code status: full  DVT prophylaxis:     Care Plan discussed with: Patient/Family  Anticipated Disposition: Home w/Family  Anticipated Discharge: Greater than 48 hours Hospital Problems  Date Reviewed: 1/9/2019          Codes Class Noted POA    COVID-19 affecting pregnancy in third trimester ICD-10-CM: O98.513, U07.1  ICD-9-CM: 647.63, 079.89  10/29/2020 Unknown                Review of Systems:   A comprehensive review of systems was negative except for that written in the HPI. Vital Signs:    Last 24hrs VS reviewed since prior progress note. Most recent are:  Visit Vitals  BP (!) 97/58 (BP 1 Location: Left arm)   Pulse 98   Temp 98.9 °F (37.2 °C)   Resp 18   Ht 4' 10\" (1.473 m)   Wt 56.2 kg (124 lb)   SpO2 95%   BMI 25.92 kg/m²         Intake/Output Summary (Last 24 hours) at 10/31/2020 1138  Last data filed at 10/30/2020 1244  Gross per 24 hour   Intake    Output 300 ml   Net -300 ml        Physical Examination:             Constitutional:  No acute distress, looks comfortable   ENT:  Oral mucosa moist,    Resp:  No acute respiratory distress. Room air   CV:  Regular rhythm,     GI:  Distended and firm    Musculoskeletal:  No edema,     Neurologic:  Moves all extremities. Data Review:    Review and/or order of clinical lab test      Labs:     Recent Labs     10/31/20  0041 10/30/20  1015   WBC 7.0 5.9   HGB 9.2* 9.1*   HCT 27.3* 27.5*    170     Recent Labs     10/31/20  0041 10/30/20  0959 10/29/20  1526    136 136   K 3.0* 3.1* 2.9*   * 108 109*   CO2 19* 18* 20*   BUN 3* 2* 2*   CREA 0.38* 0.31* 0.32*   * 120* 117*   CA 7.1* 7.8* 8.0*   MG  --   --  2.1     Recent Labs     10/31/20  0041 10/29/20  1526   ALT 18 18   AP 92 91   TBILI 0.4 0.6   TP 6.3* 6.4   ALB 2.1* 2.3*   GLOB 4.2* 4.1*     Recent Labs     10/29/20  1526   INR 0.9   PTP 9.8   APTT 40.4*      Recent Labs     10/30/20  0959   FERR 15      No results found for: FOL, RBCF   No results for input(s): PH, PCO2, PO2 in the last 72 hours. No results for input(s): CPK, CKNDX, TROIQ in the last 72 hours.     No lab exists for component: CPKMB  Lab Results   Component Value Date/Time    Cholesterol, total 162 08/19/2020 09:57 AM    HDL Cholesterol 67 08/19/2020 09:57 AM    LDL, calculated 65.6 08/19/2020 09:57 AM    Triglyceride 147 08/19/2020 09:57 AM     No results found for: [facility]  Lab Results   Component Value Date/Time    Color YELLOW/STRAW 10/29/2020 03:26 PM    Appearance CLEAR 10/29/2020 03:26 PM    Specific gravity <1.005 10/29/2020 03:26 PM    pH (UA) 8.0 10/29/2020 03:26 PM    Protein Negative 10/29/2020 03:26 PM    Glucose Negative 10/29/2020 03:26 PM    Ketone TRACE (A) 10/29/2020 03:26 PM    Bilirubin Negative 10/29/2020 03:26 PM    Urobilinogen 1.0 10/29/2020 03:26 PM    Nitrites Negative 10/29/2020 03:26 PM    Leukocyte Esterase TRACE (A) 10/29/2020 03:26 PM    Epithelial cells MODERATE (A) 10/29/2020 03:26 PM    Bacteria Negative 10/29/2020 03:26 PM    WBC 0-4 10/29/2020 03:26 PM    RBC 0-5 10/29/2020 03:26 PM         Medications Reviewed:     Current Facility-Administered Medications   Medication Dose Route Frequency    potassium chloride SR (KLOR-CON 10) tablet 40 mEq  40 mEq Oral DAILY    guaiFENesin-dextromethorphan (ROBITUSSIN DM) 100-10 mg/5 mL syrup 5 mL  5 mL Oral Q6H PRN    dexamethasone (DECADRON) 4 mg/mL injection 6 mg  6 mg IntraVENous Q24H    remdesivir 100 mg in 0.9% sodium chloride 250 mL IVPB  100 mg IntraVENous Q24H    enoxaparin (LOVENOX) injection 30 mg  30 mg SubCUTAneous Q12H    zinc sulfate (ZINCATE) 220 (50) mg capsule 1 Cap  1 Cap Oral DAILY    ascorbic acid (vitamin C) (VITAMIN C) tablet 500 mg  500 mg Oral BID    cefTRIAXone (ROCEPHIN) 1 g in 0.9% sodium chloride (MBP/ADV) 50 mL  1 g IntraVENous Q24H    azithromycin (ZITHROMAX) 500 mg in 0.9% sodium chloride (MBP/ADV) 250 mL  500 mg IntraVENous Q24H     ______________________________________________________________________  EXPECTED LENGTH OF STAY: 3d 7h  ACTUAL LENGTH OF STAY:          2                 Abeba Tai MD

## 2020-10-31 NOTE — ROUTINE PROCESS
Bedside shift change report given to Ramirez (oncoming nurse) by Ronna Peñaloza (offgoing nurse). Report included the following information SBAR, Kardex, Intake/Output, MAR and Recent Results.

## 2020-10-31 NOTE — PROGRESS NOTES
High Risk Obstetrics Progress Note    Name: Ophelia James MRN: [id number]  SSN: [id number]    YOB: 1990  Age: 26 y.o. Sex: female      Subjective:      LOS: 2 days    25 weeks    In an effort to reduce the risk of spreading illness, and limit use of PPE, the patient's chart has been reviewed and her care has been discussed with the OB nurse who performed monitoring.     Patient admitted for symptomatic Covid in setting of pregnancy. No OB complaints reported from nurse. She reports good fetal movement. Objective:     Vitals:  Blood pressure (!) 90/57, pulse 87, temperature 98.1 °F (36.7 °C), resp. rate 16, height 4' 10\" (1.473 m), weight 56.2 kg (124 lb), last menstrual period 2020, SpO2 94 %.    Temp (24hrs), Av.3 °F (36.8 °C), Min:97.8 °F (36.6 °C), Max:98.9 °F (31.0 °C)    Systolic (34IVE), FBR:48 , Min:87 , OEL:21      Diastolic (06TYO), KBR:21, Min:57, Max:59       Intake and Output:         Physical Exam:    Uterine Activity:  3 mild contractions during NST    Fetal Heart Rate:  Reactive  Baseline: 130s per minute  Variability: moderate  Accelerations: yes  Decelerations: none        Labs:   Recent Results (from the past 36 hour(s))   METABOLIC PANEL, BASIC    Collection Time: 10/30/20  9:59 AM   Result Value Ref Range    Sodium 136 136 - 145 mmol/L    Potassium 3.1 (L) 3.5 - 5.1 mmol/L    Chloride 108 97 - 108 mmol/L    CO2 18 (L) 21 - 32 mmol/L    Anion gap 10 5 - 15 mmol/L    Glucose 120 (H) 65 - 100 mg/dL    BUN 2 (L) 6 - 20 MG/DL    Creatinine 0.31 (L) 0.55 - 1.02 MG/DL    BUN/Creatinine ratio 6 (L) 12 - 20      GFR est AA >60 >60 ml/min/1.73m2    GFR est non-AA >60 >60 ml/min/1.73m2    Calcium 7.8 (L) 8.5 - 10.1 MG/DL   C REACTIVE PROTEIN, QT    Collection Time: 10/30/20  9:59 AM   Result Value Ref Range    C-Reactive protein 4.58 (H) 0.00 - 0.60 mg/dL   FERRITIN    Collection Time: 10/30/20  9:59 AM   Result Value Ref Range    Ferritin 15 8 - 252 NG/ML   PROCALCITONIN Collection Time: 10/30/20  9:59 AM   Result Value Ref Range    Procalcitonin 0.07 ng/mL   NT-PRO BNP    Collection Time: 10/30/20  9:59 AM   Result Value Ref Range    NT pro- (H) <125 PG/ML   CBC WITH AUTOMATED DIFF    Collection Time: 10/30/20 10:15 AM   Result Value Ref Range    WBC 5.9 3.6 - 11.0 K/uL    RBC 3.02 (L) 3.80 - 5.20 M/uL    HGB 9.1 (L) 11.5 - 16.0 g/dL    HCT 27.5 (L) 35.0 - 47.0 %    MCV 91.1 80.0 - 99.0 FL    MCH 30.1 26.0 - 34.0 PG    MCHC 33.1 30.0 - 36.5 g/dL    RDW 14.6 (H) 11.5 - 14.5 %    PLATELET 813 186 - 890 K/uL    MPV 11.5 8.9 - 12.9 FL    NRBC 0.0 0  WBC    ABSOLUTE NRBC 0.00 0.00 - 0.01 K/uL    NEUTROPHILS 75 32 - 75 %    LYMPHOCYTES 18 12 - 49 %    MONOCYTES 5 5 - 13 %    EOSINOPHILS 0 0 - 7 %    BASOPHILS 0 0 - 1 %    IMMATURE GRANULOCYTES 2 (H) 0.0 - 0.5 %    ABS. NEUTROPHILS 4.4 1.8 - 8.0 K/UL    ABS. LYMPHOCYTES 1.1 0.8 - 3.5 K/UL    ABS. MONOCYTES 0.3 0.0 - 1.0 K/UL    ABS. EOSINOPHILS 0.0 0.0 - 0.4 K/UL    ABS. BASOPHILS 0.0 0.0 - 0.1 K/UL    ABS. IMM. GRANS. 0.1 (H) 0.00 - 0.04 K/UL    DF AUTOMATED     D DIMER    Collection Time: 10/31/20 12:41 AM   Result Value Ref Range    D-dimer 0.75 (H) 0.00 - 0.65 mg/L FEU   CBC WITH AUTOMATED DIFF    Collection Time: 10/31/20 12:41 AM   Result Value Ref Range    WBC 7.0 3.6 - 11.0 K/uL    RBC 3.00 (L) 3.80 - 5.20 M/uL    HGB 9.2 (L) 11.5 - 16.0 g/dL    HCT 27.3 (L) 35.0 - 47.0 %    MCV 91.0 80.0 - 99.0 FL    MCH 30.7 26.0 - 34.0 PG    MCHC 33.7 30.0 - 36.5 g/dL    RDW 14.6 (H) 11.5 - 14.5 %    PLATELET 304 020 - 965 K/uL    MPV 11.1 8.9 - 12.9 FL    NRBC 0.0 0  WBC    ABSOLUTE NRBC 0.00 0.00 - 0.01 K/uL    NEUTROPHILS 75 32 - 75 %    LYMPHOCYTES 18 12 - 49 %    MONOCYTES 5 5 - 13 %    EOSINOPHILS 0 0 - 7 %    BASOPHILS 0 0 - 1 %    IMMATURE GRANULOCYTES 2 (H) 0.0 - 0.5 %    ABS. NEUTROPHILS 5.3 1.8 - 8.0 K/UL    ABS. LYMPHOCYTES 1.3 0.8 - 3.5 K/UL    ABS. MONOCYTES 0.3 0.0 - 1.0 K/UL    ABS.  EOSINOPHILS 0.0 0.0 - 0.4 K/UL    ABS. BASOPHILS 0.0 0.0 - 0.1 K/UL    ABS. IMM. GRANS. 0.1 (H) 0.00 - 0.04 K/UL    DF AUTOMATED     METABOLIC PANEL, COMPREHENSIVE    Collection Time: 10/31/20 12:41 AM   Result Value Ref Range    Sodium 138 136 - 145 mmol/L    Potassium 3.0 (L) 3.5 - 5.1 mmol/L    Chloride 111 (H) 97 - 108 mmol/L    CO2 19 (L) 21 - 32 mmol/L    Anion gap 8 5 - 15 mmol/L    Glucose 111 (H) 65 - 100 mg/dL    BUN 3 (L) 6 - 20 MG/DL    Creatinine 0.38 (L) 0.55 - 1.02 MG/DL    BUN/Creatinine ratio 8 (L) 12 - 20      GFR est AA >60 >60 ml/min/1.73m2    GFR est non-AA >60 >60 ml/min/1.73m2    Calcium 7.1 (L) 8.5 - 10.1 MG/DL    Bilirubin, total 0.4 0.2 - 1.0 MG/DL    ALT (SGPT) 18 12 - 78 U/L    AST (SGOT) 23 15 - 37 U/L    Alk. phosphatase 92 45 - 117 U/L    Protein, total 6.3 (L) 6.4 - 8.2 g/dL    Albumin 2.1 (L) 3.5 - 5.0 g/dL    Globulin 4.2 (H) 2.0 - 4.0 g/dL    A-G Ratio 0.5 (L) 1.1 - 2.2         Assessment and Plan:       Active Problems:    COVID-19 affecting pregnancy in third trimester (10/29/2020)       Pregnancy and Covid 19 positive with symptoms   --Continue management per ID/Pulm/Medicine teams  --Patient has been started on Remdesivir  & Dexamethasone  --She continues on empiric Azithromycin and Rocephin  --Prenatal records are being requested from her primary OB's office  --MFM has been consulted, please see Dr. Romana Mose note for detailed recommendtations

## 2020-11-01 LAB
ALBUMIN SERPL-MCNC: 2.1 G/DL (ref 3.5–5)
ALBUMIN/GLOB SERPL: 0.5 {RATIO} (ref 1.1–2.2)
ALP SERPL-CCNC: 94 U/L (ref 45–117)
ALT SERPL-CCNC: 26 U/L (ref 12–78)
ANION GAP SERPL CALC-SCNC: 6 MMOL/L (ref 5–15)
AST SERPL-CCNC: 33 U/L (ref 15–37)
BILIRUB SERPL-MCNC: 0.4 MG/DL (ref 0.2–1)
BUN SERPL-MCNC: 4 MG/DL (ref 6–20)
BUN/CREAT SERPL: 16 (ref 12–20)
CALCIUM SERPL-MCNC: 7.7 MG/DL (ref 8.5–10.1)
CHLORIDE SERPL-SCNC: 113 MMOL/L (ref 97–108)
CO2 SERPL-SCNC: 20 MMOL/L (ref 21–32)
CREAT SERPL-MCNC: 0.25 MG/DL (ref 0.55–1.02)
D DIMER PPP FEU-MCNC: 0.73 MG/L FEU (ref 0–0.65)
GLOBULIN SER CALC-MCNC: 4 G/DL (ref 2–4)
GLUCOSE SERPL-MCNC: 77 MG/DL (ref 65–100)
L PNEUMO1 AG UR QL IA: NEGATIVE
POTASSIUM SERPL-SCNC: 3.4 MMOL/L (ref 3.5–5.1)
PROT SERPL-MCNC: 6.1 G/DL (ref 6.4–8.2)
SODIUM SERPL-SCNC: 139 MMOL/L (ref 136–145)
SPECIMEN SOURCE: NORMAL

## 2020-11-01 PROCEDURE — 74011250636 HC RX REV CODE- 250/636: Performed by: INTERNAL MEDICINE

## 2020-11-01 PROCEDURE — 36415 COLL VENOUS BLD VENIPUNCTURE: CPT

## 2020-11-01 PROCEDURE — 59025 FETAL NON-STRESS TEST: CPT

## 2020-11-01 PROCEDURE — 74011000258 HC RX REV CODE- 258: Performed by: INTERNAL MEDICINE

## 2020-11-01 PROCEDURE — 85379 FIBRIN DEGRADATION QUANT: CPT

## 2020-11-01 PROCEDURE — 74011250637 HC RX REV CODE- 250/637: Performed by: INTERNAL MEDICINE

## 2020-11-01 PROCEDURE — 74011250636 HC RX REV CODE- 250/636: Performed by: NURSE PRACTITIONER

## 2020-11-01 PROCEDURE — 74011000250 HC RX REV CODE- 250: Performed by: INTERNAL MEDICINE

## 2020-11-01 PROCEDURE — 80053 COMPREHEN METABOLIC PANEL: CPT

## 2020-11-01 PROCEDURE — 65660000000 HC RM CCU STEPDOWN

## 2020-11-01 RX ADMIN — DEXAMETHASONE SODIUM PHOSPHATE 6 MG: 4 INJECTION, SOLUTION INTRAMUSCULAR; INTRAVENOUS at 11:25

## 2020-11-01 RX ADMIN — OXYCODONE HYDROCHLORIDE AND ACETAMINOPHEN 500 MG: 500 TABLET ORAL at 08:47

## 2020-11-01 RX ADMIN — GUAIFENESIN AND DEXTROMETHORPHAN 5 ML: 100; 10 SYRUP ORAL at 11:25

## 2020-11-01 RX ADMIN — POTASSIUM CHLORIDE 40 MEQ: 750 TABLET, FILM COATED, EXTENDED RELEASE ORAL at 08:46

## 2020-11-01 RX ADMIN — AZITHROMYCIN 500 MG: 500 INJECTION, POWDER, LYOPHILIZED, FOR SOLUTION INTRAVENOUS at 18:22

## 2020-11-01 RX ADMIN — ZINC SULFATE 220 MG (50 MG) CAPSULE 1 CAPSULE: CAPSULE at 08:47

## 2020-11-01 RX ADMIN — ENOXAPARIN SODIUM 30 MG: 30 INJECTION SUBCUTANEOUS at 11:25

## 2020-11-01 RX ADMIN — OXYCODONE HYDROCHLORIDE AND ACETAMINOPHEN 500 MG: 500 TABLET ORAL at 17:06

## 2020-11-01 RX ADMIN — ENOXAPARIN SODIUM 30 MG: 30 INJECTION SUBCUTANEOUS at 21:18

## 2020-11-01 RX ADMIN — REMDESIVIR 100 MG: 100 INJECTION, POWDER, LYOPHILIZED, FOR SOLUTION INTRAVENOUS at 13:45

## 2020-11-01 RX ADMIN — CEFTRIAXONE SODIUM 1 G: 1 INJECTION, POWDER, FOR SOLUTION INTRAMUSCULAR; INTRAVENOUS at 17:06

## 2020-11-01 NOTE — ROUTINE PROCESS
Bedside shift change report given to Ramirez (oncoming nurse) by Link Seaman (offgoing nurse). Report included the following information SBAR, Kardex, Intake/Output, MAR and Recent Results.

## 2020-11-01 NOTE — PROGRESS NOTES
Bedside shift change report given to Ji Grove (oncoming nurse) by Deena Azar RN (offgoing nurse). Report included the following information SBAR, Kardex and MAR.

## 2020-11-01 NOTE — PROGRESS NOTES
Pulmonary    On RA  Receiving rocepin, azithromycin, decadron, remdesivir, lovenox and vitamin supplementation.     Will be available to see if needed    Yael Burroughs MD

## 2020-11-01 NOTE — PROGRESS NOTES
[facility] Adult  Hospitalist Group                                                                                          Hospitalist Progress Note  Dolores Street MD  Answering service: [phone] from in house phone        Date of Service:  2020  NAME:  Rajendra Naqvi  :  1990  MRN:  [id number]      Please note that this dictation was completed with THUBIT, the computer voice recognition software. Quite often unanticipated grammatical, syntax, homophones, and other interpretive errors are inadvertently transcribed by the computer software. Please disregard these errors. Please excuse any errors that have escaped final proofreading. Admission Summary:   pt w + covid status as per note:  \"26year-old female G2, P1 at 25 weeks presents with complaints of 1 day cough, pleuritic chest pain, shortness of breath.  Patient reports that she tested positive for COVID-19 on 2020.  At that time she had no symptoms. Fabien Nye father-in-law had recently tested positive for coronavirus\" pt also notes of the 6 people in her house hold all + covid except her daughter 2 yrs age is neg,  Pt is a non smoker, no alcohol. Pt deneis other symptoms, no rash, no n/v, no neck pain. Interval history / Subjective:     PATIENT is doing good! Cough intermittently   On room air     Eating and drinking good ! Assessment & Plan:     Covid positive pneumonia  Started on remdesivir and the Decadron  ID and pulmonology on board. On empiric antibiotics ceftriaxone and azithromycin  Lovenox per protocol. Zinc sulfate and vitamin C per protocol.   Daily Covid labs Legionella and strep pneumoniae antigen test  Stable labs so far  Patient refused convalescent plasma on       25-week pregnancy  OB on board  Daily NST  FM consult  Rest per OB          Code status: full  DVT prophylaxis:     Care Plan discussed with: Patient/Family  Anticipated Disposition: Home w/Family  Anticipated Discharge: Greater than 48 hours     Hospital Problems  Date Reviewed: 1/9/2019          Codes Class Noted POA    COVID-19 affecting pregnancy in third trimester ICD-10-CM: O98.513, U07.1  ICD-9-CM: 647.63, 079.89  10/29/2020 Unknown                Review of Systems:   A comprehensive review of systems was negative except for that written in the HPI. Vital Signs:    Last 24hrs VS reviewed since prior progress note. Most recent are:  Visit Vitals  BP (!) 92/54 (BP 1 Location: Left arm)   Pulse 68   Temp 97.9 °F (36.6 °C)   Resp 18   Ht 4' 10\" (1.473 m)   Wt 56.2 kg (124 lb)   SpO2 97%   BMI 25.92 kg/m²       No intake or output data in the 24 hours ending 11/01/20 1336     Physical Examination:             Due to the current Covid pandemic, shortage of PPE and to limit exposure I have not done a physical exam on this patient today    TELEPHONIC VISITS ONLY                                      Data Review:    Review and/or order of clinical lab test      Labs:     Recent Labs     10/31/20  0041 10/30/20  1015   WBC 7.0 5.9   HGB 9.2* 9.1*   HCT 27.3* 27.5*    170     Recent Labs     11/01/20  0221 10/31/20  0041 10/30/20  0959 10/29/20  1526    138 136 136   K 3.4* 3.0* 3.1* 2.9*   * 111* 108 109*   CO2 20* 19* 18* 20*   BUN 4* 3* 2* 2*   CREA 0.25* 0.38* 0.31* 0.32*   GLU 77 111* 120* 117*   CA 7.7* 7.1* 7.8* 8.0*   MG  --   --   --  2.1     Recent Labs     11/01/20  0221 10/31/20  0041 10/29/20  1526   ALT 26 18 18   AP 94 92 91   TBILI 0.4 0.4 0.6   TP 6.1* 6.3* 6.4   ALB 2.1* 2.1* 2.3*   GLOB 4.0 4.2* 4.1*     Recent Labs     10/29/20  1526   INR 0.9   PTP 9.8   APTT 40.4*      Recent Labs     10/30/20  0959   FERR 15      No results found for: FOL, RBCF   No results for input(s): PH, PCO2, PO2 in the last 72 hours. No results for input(s): CPK, CKNDX, TROIQ in the last 72 hours.     No lab exists for component: CPKMB  Lab Results   Component Value Date/Time    Cholesterol, total 162 08/19/2020 09:57 AM    HDL Cholesterol 67 08/19/2020 09:57 AM    LDL, calculated 65.6 08/19/2020 09:57 AM    Triglyceride 147 08/19/2020 09:57 AM     No results found for: [facility]  Lab Results   Component Value Date/Time    Color YELLOW/STRAW 10/29/2020 03:26 PM    Appearance CLEAR 10/29/2020 03:26 PM    Specific gravity <1.005 10/29/2020 03:26 PM    pH (UA) 8.0 10/29/2020 03:26 PM    Protein Negative 10/29/2020 03:26 PM    Glucose Negative 10/29/2020 03:26 PM    Ketone TRACE (A) 10/29/2020 03:26 PM    Bilirubin Negative 10/29/2020 03:26 PM    Urobilinogen 1.0 10/29/2020 03:26 PM    Nitrites Negative 10/29/2020 03:26 PM    Leukocyte Esterase TRACE (A) 10/29/2020 03:26 PM    Epithelial cells MODERATE (A) 10/29/2020 03:26 PM    Bacteria Negative 10/29/2020 03:26 PM    WBC 0-4 10/29/2020 03:26 PM    RBC 0-5 10/29/2020 03:26 PM         Medications Reviewed:     Current Facility-Administered Medications   Medication Dose Route Frequency    potassium chloride SR (KLOR-CON 10) tablet 40 mEq  40 mEq Oral DAILY    guaiFENesin-dextromethorphan (ROBITUSSIN DM) 100-10 mg/5 mL syrup 5 mL  5 mL Oral Q6H PRN    ondansetron (ZOFRAN) injection 4 mg  4 mg IntraVENous Q8H PRN    dexamethasone (DECADRON) 4 mg/mL injection 6 mg  6 mg IntraVENous Q24H    remdesivir 100 mg in 0.9% sodium chloride 250 mL IVPB  100 mg IntraVENous Q24H    enoxaparin (LOVENOX) injection 30 mg  30 mg SubCUTAneous Q12H    zinc sulfate (ZINCATE) 220 (50) mg capsule 1 Cap  1 Cap Oral DAILY    ascorbic acid (vitamin C) (VITAMIN C) tablet 500 mg  500 mg Oral BID    cefTRIAXone (ROCEPHIN) 1 g in 0.9% sodium chloride (MBP/ADV) 50 mL  1 g IntraVENous Q24H    azithromycin (ZITHROMAX) 500 mg in 0.9% sodium chloride (MBP/ADV) 250 mL  500 mg IntraVENous Q24H     ______________________________________________________________________  EXPECTED LENGTH OF STAY: 3d 7h  ACTUAL LENGTH OF STAY:          3                 Tyrell Hood MD

## 2020-11-01 NOTE — PROGRESS NOTES
High Risk Obstetrics Progress Note    Name: Jocelyn Figueroa MRN: [id number]  SSN: [id number]    YOB: 1990  Age: 26 y.o. Sex: female      Subjective:      LOS: 3 days    Estimated Date of Delivery: None noted. Gestational Age Today: Unknown       In an effort to reduce the risk of spreading illness, and limit use of PPE, the patient's chart has been reviewed and her care has been discussed with the OB nurse who performed monitoring.     Patient admitted for symptomatic Covid in setting of pregnancy. No OB complaints reported from nurse. She reports good fetal movement      Objective:     Vitals:  Blood pressure (!) 90/54, pulse 78, temperature 98 °F (36.7 °C), resp. rate 18, height 4' 10\" (1.473 m), weight 56.2 kg (124 lb), last menstrual period 2020, SpO2 97 %.    Temp (24hrs), Av.6 °F (36.4 °C), Min:97.3 °F (36.3 °C), Max:98 °F (89.1 °C)    Systolic (58PIZ), YFR:06 , Min:90 , ZSL:88      Diastolic (30RKI), DXR:12, Min:52, Max:59       Intake and Output:         Physical Exam:  Deferred       Membranes:  Intact    Uterine Activity:  None    Fetal Heart Rate:  Reactive  Baseline: 130 per minute  Variability: moderate  Accelerations: yes  Decelerations: none  Uterine contractions: none        Labs:   Recent Results (from the past 36 hour(s))   D DIMER    Collection Time: 20  2:21 AM   Result Value Ref Range    D-dimer 0.73 (H) 0.00 - 0.65 mg/L FEU   METABOLIC PANEL, COMPREHENSIVE    Collection Time: 20  2:21 AM   Result Value Ref Range    Sodium 139 136 - 145 mmol/L    Potassium 3.4 (L) 3.5 - 5.1 mmol/L    Chloride 113 (H) 97 - 108 mmol/L    CO2 20 (L) 21 - 32 mmol/L    Anion gap 6 5 - 15 mmol/L    Glucose 77 65 - 100 mg/dL    BUN 4 (L) 6 - 20 MG/DL    Creatinine 0.25 (L) 0.55 - 1.02 MG/DL    BUN/Creatinine ratio 16 12 - 20      GFR est AA >60 >60 ml/min/1.73m2    GFR est non-AA >60 >60 ml/min/1.73m2    Calcium 7.7 (L) 8.5 - 10.1 MG/DL    Bilirubin, total 0.4 0.2 - 1.0 MG/DL    ALT (SGPT) 26 12 - 78 U/L    AST (SGOT) 33 15 - 37 U/L    Alk. phosphatase 94 45 - 117 U/L    Protein, total 6.1 (L) 6.4 - 8.2 g/dL    Albumin 2.1 (L) 3.5 - 5.0 g/dL    Globulin 4.0 2.0 - 4.0 g/dL    A-G Ratio 0.5 (L) 1.1 - 2.2         Assessment and Plan:       Active Problems:    COVID-19 affecting pregnancy in third trimester (10/29/2020)       Pregnancy and Covid 19 positive with symptoms   --Continue management per ID/Pulm/Medicine teams  --Patient has been started on Remdesivir  & Dexamethasone  --She continues on empiric Azithromycin and Rocephin  --Prenatal records are being requested from her primary OB's office  --MFM has been consulted, please see Dr. Jamar Ortez note for detailed recommendtations     Signed By: Vipin Smith MD     November 1, 2020

## 2020-11-01 NOTE — PROGRESS NOTES
Bedside and Verbal shift change report given to Araceli Gil RN (oncoming nurse) by Angelica Mcpherson RN (offgoing nurse). Report included the following information SBAR, Kardex, Intake/Output, MAR, Recent Results and Med Rec Status.

## 2020-11-01 NOTE — PROGRESS NOTES
ID Progress Note  2020    Subjective:     \"Breathing better. \"    Review of Systems:            Symptom Y/N Comments   Symptom Y/N Comments   Fever/Chills  n     Chest Pain  n      Poor Appetite  y     Edema        Cough y     Abdominal Pain        Sputum n      Joint Pain        SOB/GUZMAN y      Pruritis/Rash        Nausea/vomit n     Tolerating PT/OT        Diarrhea  n     Tolerating Diet        Constipation  n     Other           Could NOT obtain due to:       Objective:     Vitals:   Visit Vitals  BP (!) 90/54 (BP 1 Location: Left arm, BP Patient Position: At rest)   Pulse 78   Temp 98 °F (36.7 °C)   Resp 18   Ht 4' 10\" (1.473 m)   Wt 56.2 kg (124 lb)   LMP 2020   SpO2 97%   BMI 25.92 kg/m²        Tmax:  Temp (24hrs), Av.6 °F (36.4 °C), Min:97.3 °F (36.3 °C), Max:98 °F (36.7 °C)      PHYSICAL EXAM:  General: WD, WN. Alert, cooperative, no acute distress    EENT:  EOMI. Anicteric sclerae. MMM  Resp:  CTA bilaterally, no wheezing or rales. No accessory muscle use  CV:  Regular  rhythm,  No edema  GI:  Soft, Non distended, Non tender. +Bowel sounds  Neurologic:  Alert and oriented X 3, normal speech,   Psych:   Good insight. Not anxious nor agitated  Skin:  No rashes.   No jaundice    Labs:   Lab Results   Component Value Date/Time    WBC 7.0 10/31/2020 12:41 AM    HGB 9.2 (L) 10/31/2020 12:41 AM    HCT 27.3 (L) 10/31/2020 12:41 AM    PLATELET 389  12:41 AM    MCV 91.0 10/31/2020 12:41 AM     Lab Results   Component Value Date/Time    Sodium 139 2020 02:21 AM    Potassium 3.4 (L) 2020 02:21 AM    Chloride 113 (H) 2020 02:21 AM    CO2 20 (L) 2020 02:21 AM    Anion gap 6 2020 02:21 AM    Glucose 77 2020 02:21 AM    BUN 4 (L) 2020 02:21 AM    Creatinine 0.25 (L) 2020 02:21 AM    BUN/Creatinine ratio 16 2020 02:21 AM    GFR est AA >60 2020 02:21 AM    GFR est non-AA >60 2020 02:21 AM    Calcium 7.7 (L) 2020 02:21 AM Bilirubin, total 0.4 11/01/2020 02:21 AM    Alk. phosphatase 94 11/01/2020 02:21 AM    Protein, total 6.1 (L) 11/01/2020 02:21 AM    Albumin 2.1 (L) 11/01/2020 02:21 AM    Globulin 4.0 11/01/2020 02:21 AM    A-G Ratio 0.5 (L) 11/01/2020 02:21 AM    ALT (SGPT) 26 11/01/2020 02:21 AM             Assessment and Plan     Acute respiratory failure secondary to COVID 19 and PNA  - COVID 19 (+): outside facility    CXR: several scattered pulmonary opacities bilaterally may represent atypical pneumonia. Complete total 5 days of rocephin and Zithromax     IV remdesivir in progress; last dose 11/3    Continue with Decadron, Vit C, and zinc    Weaned off from supplemental O2    Pt can be discharged once she completes Remdesivir therapy.  ID team will follow peripherally    Nausea r/t gestation (resolving)  - antiemetic PRN    OB following          Eva Lang NP

## 2020-11-01 NOTE — PROGRESS NOTES
1250-NST started,  Fetus very active and difficult keeping on monitor continuously at first.  Pt denies any pain or contractions. Encouraged patient to increase PO intake. 1314-Nurse remains in room attempting to get continuous tracing. Fetus very active. 1325-Pt off monitor. NST reactive,  No contractions noted. Pt again encouraged to PO hydrate and report any abdominal pain to nurse. Pt states understanding. 1345-Dr Cox aware of FHR tracing and no ucs noted on monitor and patient denies any abdominal pain.

## 2020-11-01 NOTE — PROGRESS NOTES
ID Progress Note  10/31/2020    Subjective:     \"Breathing is better. \"  Review of Systems:            Symptom Y/N Comments   Symptom Y/N Comments   Fever/Chills  n     Chest Pain  n      Poor Appetite  y     Edema        Cough y      Abdominal Pain        Sputum n      Joint Pain        SOB/GUZMAN y      Pruritis/Rash        Nausea/vomit y      Tolerating PT/OT        Diarrhea  n     Tolerating Diet        Constipation  n     Other           Could NOT obtain due to:       Objective:     Vitals:   Visit Vitals  BP (!) 90/57 (BP 1 Location: Left arm, BP Patient Position: Sitting)   Pulse 87   Temp 98.1 °F (36.7 °C)   Resp 16   Ht 4' 10\" (1.473 m)   Wt 56.2 kg (124 lb)   LMP 2020   SpO2 94%   BMI 25.92 kg/m²        Tmax:  Temp (24hrs), Av.3 °F (36.8 °C), Min:97.8 °F (36.6 °C), Max:98.9 °F (37.2 °C)      PHYSICAL EXAM:  General: WD, WN. Alert, cooperative, no acute distress    EENT:  EOMI. Anicteric sclerae. MMM  Resp:  CTA bilaterally, no wheezing or rales. No accessory muscle use  CV:  Regular  rhythm,  No edema  GI:  Soft, Non distended, Non tender. +Bowel sounds  Neurologic:  Alert and oriented X 3, normal speech,   Psych:   Good insight. Not anxious nor agitated  Skin:  No rashes.   No jaundice    Labs:   Lab Results   Component Value Date/Time    WBC 7.0 10/31/2020 12:41 AM    HGB 9.2 (L) 10/31/2020 12:41 AM    HCT 27.3 (L) 10/31/2020 12:41 AM    PLATELET 478  12:41 AM    MCV 91.0 10/31/2020 12:41 AM     Lab Results   Component Value Date/Time    Sodium 138 10/31/2020 12:41 AM    Potassium 3.0 (L) 10/31/2020 12:41 AM    Chloride 111 (H) 10/31/2020 12:41 AM    CO2 19 (L) 10/31/2020 12:41 AM    Anion gap 8 10/31/2020 12:41 AM    Glucose 111 (H) 10/31/2020 12:41 AM    BUN 3 (L) 10/31/2020 12:41 AM    Creatinine 0.38 (L) 10/31/2020 12:41 AM    BUN/Creatinine ratio 8 (L) 10/31/2020 12:41 AM    GFR est AA >60 10/31/2020 12:41 AM    GFR est non-AA >60 10/31/2020 12:41 AM    Calcium 7.1 (L) 10/31/2020 12:41 AM    Bilirubin, total 0.4 10/31/2020 12:41 AM    Alk. phosphatase 92 10/31/2020 12:41 AM    Protein, total 6.3 (L) 10/31/2020 12:41 AM    Albumin 2.1 (L) 10/31/2020 12:41 AM    Globulin 4.2 (H) 10/31/2020 12:41 AM    A-G Ratio 0.5 (L) 10/31/2020 12:41 AM    ALT (SGPT) 18 10/31/2020 12:41 AM             Assessment and Plan     Acute respiratory failure secondary to COVID 19 and PNA  - COVID 19 (+): outside facility    CXR: several scattered pulmonary opacities bilaterally may represent atypical pneumonia.     Continue with rocephin and Zithromax as directed it    Continue with IV remdesivir    Continue with Decadron, Vit C, and zinc    Wean O2 as long as O2 sat >= 94%    Procal 0.07    Nausea r/t gestation  - antiemetic PRN    OB following          Eva Cruz, NP

## 2020-11-02 LAB
ALBUMIN SERPL-MCNC: 2.1 G/DL (ref 3.5–5)
ALBUMIN/GLOB SERPL: 0.5 {RATIO} (ref 1.1–2.2)
ALP SERPL-CCNC: 101 U/L (ref 45–117)
ALT SERPL-CCNC: 36 U/L (ref 12–78)
ANION GAP SERPL CALC-SCNC: 7 MMOL/L (ref 5–15)
AST SERPL-CCNC: 47 U/L (ref 15–37)
BILIRUB SERPL-MCNC: 0.6 MG/DL (ref 0.2–1)
BUN SERPL-MCNC: 4 MG/DL (ref 6–20)
BUN/CREAT SERPL: 18 (ref 12–20)
CALCIUM SERPL-MCNC: 7.6 MG/DL (ref 8.5–10.1)
CHLORIDE SERPL-SCNC: 113 MMOL/L (ref 97–108)
CO2 SERPL-SCNC: 19 MMOL/L (ref 21–32)
CREAT SERPL-MCNC: 0.22 MG/DL (ref 0.55–1.02)
D DIMER PPP FEU-MCNC: 0.8 MG/L FEU (ref 0–0.65)
FLUID CULTURE, SPNG2: NORMAL
GLOBULIN SER CALC-MCNC: 4 G/DL (ref 2–4)
GLUCOSE SERPL-MCNC: 80 MG/DL (ref 65–100)
ORGANISM ID, SPNG3: NORMAL
PLEASE NOTE, SPNG4: NORMAL
POTASSIUM SERPL-SCNC: 3.4 MMOL/L (ref 3.5–5.1)
PROT SERPL-MCNC: 6.1 G/DL (ref 6.4–8.2)
S PNEUM AG SPEC QL LA: NEGATIVE
SODIUM SERPL-SCNC: 139 MMOL/L (ref 136–145)
SPECIMEN SOURCE: NORMAL
SPECIMEN, SPNG1: NORMAL

## 2020-11-02 PROCEDURE — 85379 FIBRIN DEGRADATION QUANT: CPT

## 2020-11-02 PROCEDURE — 74011000258 HC RX REV CODE- 258: Performed by: INTERNAL MEDICINE

## 2020-11-02 PROCEDURE — 36415 COLL VENOUS BLD VENIPUNCTURE: CPT

## 2020-11-02 PROCEDURE — 74011000250 HC RX REV CODE- 250: Performed by: INTERNAL MEDICINE

## 2020-11-02 PROCEDURE — 74011250636 HC RX REV CODE- 250/636: Performed by: NURSE PRACTITIONER

## 2020-11-02 PROCEDURE — 74011250637 HC RX REV CODE- 250/637: Performed by: INTERNAL MEDICINE

## 2020-11-02 PROCEDURE — 59025 FETAL NON-STRESS TEST: CPT

## 2020-11-02 PROCEDURE — 65660000000 HC RM CCU STEPDOWN

## 2020-11-02 PROCEDURE — 80053 COMPREHEN METABOLIC PANEL: CPT

## 2020-11-02 PROCEDURE — 74011250636 HC RX REV CODE- 250/636: Performed by: INTERNAL MEDICINE

## 2020-11-02 RX ADMIN — DEXAMETHASONE SODIUM PHOSPHATE 6 MG: 4 INJECTION, SOLUTION INTRAMUSCULAR; INTRAVENOUS at 11:47

## 2020-11-02 RX ADMIN — CEFTRIAXONE SODIUM 1 G: 1 INJECTION, POWDER, FOR SOLUTION INTRAMUSCULAR; INTRAVENOUS at 18:07

## 2020-11-02 RX ADMIN — OXYCODONE HYDROCHLORIDE AND ACETAMINOPHEN 500 MG: 500 TABLET ORAL at 18:07

## 2020-11-02 RX ADMIN — ENOXAPARIN SODIUM 30 MG: 30 INJECTION SUBCUTANEOUS at 21:56

## 2020-11-02 RX ADMIN — ZINC SULFATE 220 MG (50 MG) CAPSULE 1 CAPSULE: CAPSULE at 09:00

## 2020-11-02 RX ADMIN — REMDESIVIR 100 MG: 100 INJECTION, POWDER, LYOPHILIZED, FOR SOLUTION INTRAVENOUS at 11:48

## 2020-11-02 RX ADMIN — OXYCODONE HYDROCHLORIDE AND ACETAMINOPHEN 500 MG: 500 TABLET ORAL at 09:01

## 2020-11-02 RX ADMIN — AZITHROMYCIN 500 MG: 500 INJECTION, POWDER, LYOPHILIZED, FOR SOLUTION INTRAVENOUS at 19:22

## 2020-11-02 RX ADMIN — POTASSIUM CHLORIDE 40 MEQ: 750 TABLET, FILM COATED, EXTENDED RELEASE ORAL at 09:00

## 2020-11-02 RX ADMIN — ENOXAPARIN SODIUM 30 MG: 30 INJECTION SUBCUTANEOUS at 09:00

## 2020-11-02 NOTE — PROGRESS NOTES
Bedside and Verbal shift change report given to Cristóbal Ho RN (oncoming nurse) by Alexi Walters (offgoing nurse). Report included the following information SBAR, Kardex and MAR.

## 2020-11-02 NOTE — PROGRESS NOTES
Problem: Airway Clearance - Ineffective  Goal: Achieve or maintain patent airway  Outcome: Progressing Towards Goal     Problem: Falls - Risk of  Goal: *Absence of Falls  Description: Document Steve Vang Fall Risk and appropriate interventions in the flowsheet.   Outcome: Progressing Towards Goal  Note: Fall Risk Interventions:            Medication Interventions: Teach patient to arise slowly                   Problem: Pain  Goal: *Control of Pain  Outcome: Progressing Towards Goal

## 2020-11-02 NOTE — PROGRESS NOTES
[facility] Adult  Hospitalist Group                                                                                          Hospitalist Progress Note  Jordan Sanchez MD  Answering service: [phone] from in house phone        Date of Service:  2020  NAME:  Luis Eduardo Ivan  :  1990  MRN:  [id number]      Please note that this dictation was completed with [employer], the computer voice recognition software. Quite often unanticipated grammatical, syntax, homophones, and other interpretive errors are inadvertently transcribed by the computer software. Please disregard these errors. Please excuse any errors that have escaped final proofreading. Admission Summary:   pt w + covid status as per note:  \"26year-old female G2, P1 at 25 weeks presents with complaints of 1 day cough, pleuritic chest pain, shortness of breath.  Patient reports that she tested positive for COVID-19 on 2020.  At that time she had no symptoms. Vito Babin father-in-law had recently tested positive for coronavirus\" pt also notes of the 6 people in her house hold all + covid except her daughter 2 yrs age is neg,  Pt is a non smoker, no alcohol. Pt deneis other symptoms, no rash, no n/v, no neck pain. Interval history / Subjective:     PATIENT is doing good! When she coughs , then it is difficult to breathe! On room air     Eating and drinking good ! Did telephonic visit only   Stable      Assessment & Plan:     Covid positive pneumonia  Started on remdesivir and the Decadron  ID and pulmonology on board. On empiric antibiotics ceftriaxone and azithromycin  Lovenox per protocol. Zinc sulfate and vitamin C per protocol.   Daily Covid labs Legionella and strep pneumoniae antigen test  Stable labs so far  Patient refused convalescent plasma on        25-week pregnancy  OB on board  Daily NST  FM consult  Rest per OB          Code status: full  DVT prophylaxis: lovenox     Care Plan discussed with: Patient/Family  Anticipated Disposition: Home w/Family  Anticipated Discharge: Greater than 48 hours     Hospital Problems  Date Reviewed: 1/9/2019          Codes Class Noted POA    COVID-19 affecting pregnancy in third trimester ICD-10-CM: O98.513, U07.1  ICD-9-CM: 647.63, 079.89  10/29/2020 Unknown                Review of Systems:   A comprehensive review of systems was negative except for that written in the HPI. Vital Signs:    Last 24hrs VS reviewed since prior progress note. Most recent are:  Visit Vitals  BP (!) 91/51 (BP 1 Location: Left arm, BP Patient Position: At rest)   Pulse 85   Temp 98.4 °F (36.9 °C)   Resp 18   Ht 4' 10\" (1.473 m)   Wt 56.8 kg (125 lb 4.8 oz)   SpO2 96%   BMI 26.19 kg/m²       No intake or output data in the 24 hours ending 11/02/20 1502     Physical Examination:             Due to the current Covid pandemic, shortage of PPE and to limit exposure I have not done a physical exam on this patient today    TELEPHONIC VISITS ONLY                                      Data Review:    Review and/or order of clinical lab test      Labs:     Recent Labs     10/31/20  0041   WBC 7.0   HGB 9.2*   HCT 27.3*        Recent Labs     11/02/20  0543 11/01/20  0221 10/31/20  0041    139 138   K 3.4* 3.4* 3.0*   * 113* 111*   CO2 19* 20* 19*   BUN 4* 4* 3*   CREA 0.22* 0.25* 0.38*   GLU 80 77 111*   CA 7.6* 7.7* 7.1*     Recent Labs     11/02/20  0543 11/01/20  0221 10/31/20  0041   ALT 36 26 18    94 92   TBILI 0.6 0.4 0.4   TP 6.1* 6.1* 6.3*   ALB 2.1* 2.1* 2.1*   GLOB 4.0 4.0 4.2*     No results for input(s): INR, PTP, APTT, INREXT, INREXT in the last 72 hours. No results for input(s): FE, TIBC, PSAT, FERR in the last 72 hours. No results found for: FOL, RBCF   No results for input(s): PH, PCO2, PO2 in the last 72 hours. No results for input(s): CPK, CKNDX, TROIQ in the last 72 hours.     No lab exists for component: CPKMB  Lab Results   Component Value Date/Time    Cholesterol, total 162 08/19/2020 09:57 AM    HDL Cholesterol 67 08/19/2020 09:57 AM    LDL, calculated 65.6 08/19/2020 09:57 AM    Triglyceride 147 08/19/2020 09:57 AM     No results found for: Niranjan Dotson  Lab Results   Component Value Date/Time    Color YELLOW/STRAW 10/29/2020 03:26 PM    Appearance CLEAR 10/29/2020 03:26 PM    Specific gravity <1.005 10/29/2020 03:26 PM    pH (UA) 8.0 10/29/2020 03:26 PM    Protein Negative 10/29/2020 03:26 PM    Glucose Negative 10/29/2020 03:26 PM    Ketone TRACE (A) 10/29/2020 03:26 PM    Bilirubin Negative 10/29/2020 03:26 PM    Urobilinogen 1.0 10/29/2020 03:26 PM    Nitrites Negative 10/29/2020 03:26 PM    Leukocyte Esterase TRACE (A) 10/29/2020 03:26 PM    Epithelial cells MODERATE (A) 10/29/2020 03:26 PM    Bacteria Negative 10/29/2020 03:26 PM    WBC 0-4 10/29/2020 03:26 PM    RBC 0-5 10/29/2020 03:26 PM         Medications Reviewed:     Current Facility-Administered Medications   Medication Dose Route Frequency    potassium chloride SR (KLOR-CON 10) tablet 40 mEq  40 mEq Oral DAILY    guaiFENesin-dextromethorphan (ROBITUSSIN DM) 100-10 mg/5 mL syrup 5 mL  5 mL Oral Q6H PRN    ondansetron (ZOFRAN) injection 4 mg  4 mg IntraVENous Q8H PRN    dexamethasone (DECADRON) 4 mg/mL injection 6 mg  6 mg IntraVENous Q24H    remdesivir 100 mg in 0.9% sodium chloride 250 mL IVPB  100 mg IntraVENous Q24H    enoxaparin (LOVENOX) injection 30 mg  30 mg SubCUTAneous Q12H    zinc sulfate (ZINCATE) 220 (50) mg capsule 1 Cap  1 Cap Oral DAILY    ascorbic acid (vitamin C) (VITAMIN C) tablet 500 mg  500 mg Oral BID    cefTRIAXone (ROCEPHIN) 1 g in 0.9% sodium chloride (MBP/ADV) 50 mL  1 g IntraVENous Q24H    azithromycin (ZITHROMAX) 500 mg in 0.9% sodium chloride (MBP/ADV) 250 mL  500 mg IntraVENous Q24H     ______________________________________________________________________  EXPECTED LENGTH OF STAY: 3d 7h  ACTUAL LENGTH OF STAY:          Tammie, MD

## 2020-11-02 NOTE — ACP (ADVANCE CARE PLANNING)
Advance Care Planning     Advance Care Planning Activator (Inpatient)  Conversation Note      Date of ACP Conversation: 11/02/20     Conversation Conducted with:   Patient with Decision Making Capacity    ACP Activator: [address] makes decisions on behalf of the incapacitated patient: Decision Maker is asked to consider and make decisions based on patient values, known preferences, or best interests. Health Care Decision Maker:    Current Designated Health Care Decision Maker:   Primary Decision Maker: Valerio Orozco [facility] - [phone]    Care Preferences    Ventilation: \"If you were in your present state of health and suddenly became very ill and were unable to breathe on your own, what would your preference be about the use of a ventilator (breathing machine) if it were available to you? \"      If patient would desire the use of a ventilator (breathing machine), answer \"yes\", if not \"no\":yes    \"If your health worsens and it becomes clear that your chance of recovery is unlikely, what would your preference be about the use of a ventilator (breathing machine) if it were available to you? \"     Would the patient desire the use of a ventilator (breathing machine)? YES      Resuscitation  \"CPR works best to restart the heart when there is a sudden event, like a heart attack, in someone who is otherwise healthy. Unfortunately, CPR does not typically restart the heart for people who have serious health conditions or who are very sick. \"    \"In the event your heart stopped as a result of an underlying serious health condition, would you want attempts to be made to restart your heart (answer \"yes\" for attempt to resuscitate) or would you prefer a natural death (answer \"no\" for do not attempt to resuscitate)? \" yes            [] Yes  [] No   Educated Patient / Susie Bee regarding differences between Advance Directives and portable DNR orders.     Length of ACP Conversation in minutes: Conversation Outcomes:  [x] ACP discussion completed  [] Existing advance directive reviewed with patient; no changes to patient's previously recorded wishes     [x] New Advance Directive completed   [] Portable Do Not Resuscitate prepared for Provider review and signature  [] POLST/POST/MOLST/MOST prepared for Provider review and signature      Follow-up plan:    [] Schedule follow-up conversation to continue planning  [] Referred individual to Provider for additional questions/concerns   [] Advised patient/agent/surrogate to review completed ACP document and update if needed with changes in condition, patient preferences or care setting     [] This note routed to one or more involved healthcare providers      Donald Enrique [facility]

## 2020-11-02 NOTE — PROGRESS NOTES
Patient resting in bed quietly     Problem: Airway Clearance - Ineffective  Goal: Achieve or maintain patent airway  Outcome: Progressing Towards Goal     Problem: Gas Exchange - Impaired  Goal: Absence of hypoxia  Outcome: Progressing Towards Goal  Goal: Promote optimal lung function  Outcome: Progressing Towards Goal     Problem: Breathing Pattern - Ineffective  Goal: Ability to achieve and maintain a regular respiratory rate  Outcome: Progressing Towards Goal     Problem:  Body Temperature -  Risk of, Imbalanced  Goal: Ability to maintain a body temperature within defined limits  Outcome: Progressing Towards Goal  Goal: Will regain or maintain usual level of consciousness  Outcome: Progressing Towards Goal  Goal: Complications related to the disease process, condition or treatment will be avoided or minimized  Outcome: Progressing Towards Goal     Problem: Isolation Precautions - Risk of Spread of Infection  Goal: Prevent transmission of infectious organism to others  Outcome: Progressing Towards Goal     Problem: Nutrition Deficits  Goal: Optimize nutrtional status  Outcome: Progressing Towards Goal     Problem: Risk for Fluid Volume Deficit  Goal: Maintain normal heart rhythm  Outcome: Progressing Towards Goal  Goal: Maintain absence of muscle cramping  Outcome: Progressing Towards Goal  Goal: Maintain normal serum potassium, sodium, calcium, phosphorus, and pH  Outcome: Progressing Towards Goal     Problem: Loneliness or Risk for Loneliness  Goal: Demonstrate positive use of time alone when socialization is not possible  Outcome: Progressing Towards Goal     Problem: Fatigue  Goal: Verbalize increase energy and improved vitality  Outcome: Progressing Towards Goal     Problem: Patient Education: Go to Patient Education Activity  Goal: Patient/Family Education  Outcome: Progressing Towards Goal     Problem: Falls - Risk of  Goal: *Absence of Falls  Description: Document Angel Fall Risk and appropriate interventions in the flowsheet.   Outcome: Progressing Towards Goal  Note: Fall Risk Interventions:            Medication Interventions: Patient to call before getting OOB                   Problem: Patient Education: Go to Patient Education Activity  Goal: Patient/Family Education  Outcome: Progressing Towards Goal     Problem: Pain  Goal: *Control of Pain  Outcome: Progressing Towards Goal  Goal: *PALLIATIVE CARE:  Alleviation of Pain  Outcome: Progressing Towards Goal     Problem: Patient Education: Go to Patient Education Activity  Goal: Patient/Family Education  Outcome: Progressing Towards Goal     Problem: Discharge Planning  Goal: *Discharge to safe environment  Outcome: Progressing Towards Goal

## 2020-11-02 NOTE — PROGRESS NOTES
11/01/20 2149   Vital Signs   Temp 97.6 °F (36.4 °C)   Temp Source Oral   Pulse (Heart Rate) 79   Heart Rate Source Monitor   Resp Rate 18   O2 Sat (%) 97 %   Level of Consciousness Alert   BP (!) 88/46   MAP (Calculated) (!) 60   BP 1 Method Automatic   BP 1 Location Left arm   BP Patient Position Sitting   MEWS Score 2   Oxygen Therapy   O2 Device Room air     BP a little lower at 88/46. Patient trends lower. A&O x4 and no S/S of hypotension. Contacted Saran NP Hospitalist and he requested that we continue to monitor.

## 2020-11-02 NOTE — PROGRESS NOTES
Problem: Falls - Risk of  Goal: *Absence of Falls  Description: Document Maria Esther Heads Fall Risk and appropriate interventions in the flowsheet.   Outcome: Progressing Towards Goal  Note: Fall Risk Interventions:            Medication Interventions: Patient to call before getting OOB

## 2020-11-02 NOTE — PROGRESS NOTES
ORLY:  1. Last dose of Remidesivir 11/3.  2. Return home with . Care Management Interventions  PCP Verified by CM: Yes  Palliative Care Criteria Met (RRAT>21 & CHF Dx)?: No  Mode of Transport at Discharge: Other (see comment)(Her  will transport)  MyChart Signup: No  Discharge Durable Medical Equipment: No  Health Maintenance Reviewed: Yes  Physical Therapy Consult: No  Occupational Therapy Consult: No  Current Support Network: Lives with Spouse  The Plan for Transition of Care is Related to the Following Treatment Goals : ID following, continue remidesivir til 11/3. The Patient and/or Patient Representative was Provided with a Choice of Provider and Agrees with the Discharge Plan?: Yes   Resource Information Provided?: No  Discharge Location  Discharge Placement: Home with family assistance    Reason for Admission:   COVID positive                  RUR Score:        9%             Plan for utilizing home health:      No indication at this time. PCP: First and Last name:  Dr. Eyad Owens   Name of Practice:    Are you a current patient: Yes/No:  Yes    Approximate date of last visit:  June 2020    Can you participate in a virtual visit with your PCP:  Yes                    Current Advanced Directive/Advance Care Plan: Not on file, patient completed ACP discussion and requesting AMD completion. Transition of Care Plan:                      CM contacted patient via telephone to explain role and offer support. She is alert and oriented x4, and confirmed demographics. She is independent, lives with her supportive  and has no discharge concerns at this time. She would like to complete AMD with aaron carroll to page.       [facility]

## 2020-11-02 NOTE — PROGRESS NOTES
Bedside shift change report given to [address] and Geovani Rivera RN (oncoming nurse) by Paulina Ash RN (offgoing nurse). Report included the following information SBAR, Kardex, MAR and Recent Results.

## 2020-11-03 LAB
ALBUMIN SERPL-MCNC: 2.3 G/DL (ref 3.5–5)
ALBUMIN/GLOB SERPL: 0.5 {RATIO} (ref 1.1–2.2)
ALP SERPL-CCNC: 115 U/L (ref 45–117)
ALT SERPL-CCNC: 43 U/L (ref 12–78)
ANION GAP SERPL CALC-SCNC: 7 MMOL/L (ref 5–15)
AST SERPL-CCNC: 55 U/L (ref 15–37)
BILIRUB SERPL-MCNC: 0.8 MG/DL (ref 0.2–1)
BUN SERPL-MCNC: 6 MG/DL (ref 6–20)
BUN/CREAT SERPL: 20 (ref 12–20)
CALCIUM SERPL-MCNC: 8.5 MG/DL (ref 8.5–10.1)
CHLORIDE SERPL-SCNC: 112 MMOL/L (ref 97–108)
CO2 SERPL-SCNC: 19 MMOL/L (ref 21–32)
CREAT SERPL-MCNC: 0.3 MG/DL (ref 0.55–1.02)
D DIMER PPP FEU-MCNC: 1.14 MG/L FEU (ref 0–0.65)
GLOBULIN SER CALC-MCNC: 4.3 G/DL (ref 2–4)
GLUCOSE SERPL-MCNC: 72 MG/DL (ref 65–100)
POTASSIUM SERPL-SCNC: 3.3 MMOL/L (ref 3.5–5.1)
PROT SERPL-MCNC: 6.6 G/DL (ref 6.4–8.2)
SODIUM SERPL-SCNC: 138 MMOL/L (ref 136–145)

## 2020-11-03 PROCEDURE — 74011250637 HC RX REV CODE- 250/637: Performed by: INTERNAL MEDICINE

## 2020-11-03 PROCEDURE — 74011250636 HC RX REV CODE- 250/636: Performed by: INTERNAL MEDICINE

## 2020-11-03 PROCEDURE — 74011000258 HC RX REV CODE- 258: Performed by: INTERNAL MEDICINE

## 2020-11-03 PROCEDURE — 36415 COLL VENOUS BLD VENIPUNCTURE: CPT

## 2020-11-03 PROCEDURE — 65660000000 HC RM CCU STEPDOWN

## 2020-11-03 PROCEDURE — 74011250637 HC RX REV CODE- 250/637: Performed by: OBSTETRICS & GYNECOLOGY

## 2020-11-03 PROCEDURE — 74011250636 HC RX REV CODE- 250/636: Performed by: NURSE PRACTITIONER

## 2020-11-03 PROCEDURE — 80053 COMPREHEN METABOLIC PANEL: CPT

## 2020-11-03 PROCEDURE — 74011000250 HC RX REV CODE- 250: Performed by: INTERNAL MEDICINE

## 2020-11-03 PROCEDURE — 85379 FIBRIN DEGRADATION QUANT: CPT

## 2020-11-03 RX ORDER — SWAB
1 SWAB, NON-MEDICATED MISCELLANEOUS DAILY
Status: DISCONTINUED | OUTPATIENT
Start: 2020-11-03 | End: 2020-11-04 | Stop reason: HOSPADM

## 2020-11-03 RX ORDER — DEXAMETHASONE 4 MG/1
6 TABLET ORAL DAILY
Status: DISCONTINUED | OUTPATIENT
Start: 2020-11-04 | End: 2020-11-04 | Stop reason: HOSPADM

## 2020-11-03 RX ADMIN — OXYCODONE HYDROCHLORIDE AND ACETAMINOPHEN 500 MG: 500 TABLET ORAL at 17:40

## 2020-11-03 RX ADMIN — Medication 1 TABLET: at 09:00

## 2020-11-03 RX ADMIN — POTASSIUM CHLORIDE 40 MEQ: 750 TABLET, FILM COATED, EXTENDED RELEASE ORAL at 08:59

## 2020-11-03 RX ADMIN — ENOXAPARIN SODIUM 30 MG: 30 INJECTION SUBCUTANEOUS at 09:00

## 2020-11-03 RX ADMIN — DEXAMETHASONE SODIUM PHOSPHATE 6 MG: 4 INJECTION, SOLUTION INTRAMUSCULAR; INTRAVENOUS at 11:39

## 2020-11-03 RX ADMIN — REMDESIVIR 100 MG: 100 INJECTION, POWDER, LYOPHILIZED, FOR SOLUTION INTRAVENOUS at 11:39

## 2020-11-03 RX ADMIN — ZINC SULFATE 220 MG (50 MG) CAPSULE 1 CAPSULE: CAPSULE at 08:59

## 2020-11-03 RX ADMIN — ENOXAPARIN SODIUM 30 MG: 30 INJECTION SUBCUTANEOUS at 21:39

## 2020-11-03 RX ADMIN — OXYCODONE HYDROCHLORIDE AND ACETAMINOPHEN 500 MG: 500 TABLET ORAL at 08:59

## 2020-11-03 NOTE — PROGRESS NOTES
Bedside and Verbal shift change report given to Leellen Litten, RN (oncoming nurse) by Ganesh Aquino RN (offgoing nurse). Report included the following information SBAR, Kardex, Intake/Output, MAR, Recent Results and Med Rec Status.

## 2020-11-03 NOTE — PROGRESS NOTES
ID Progress Note  11/3/2020    Subjective:     \"Breathing better. \"    Review of Systems:            Symptom Y/N Comments   Symptom Y/N Comments   Fever/Chills  n     Chest Pain  n      Poor Appetite  n     Edema        Cough n     Abdominal Pain        Sputum n      Joint Pain        SOB/GUZMAN n      Pruritis/Rash        Nausea/vomit n     Tolerating PT/OT        Diarrhea  n     Tolerating Diet        Constipation  n     Other           Could NOT obtain due to:       Objective:     Vitals:   Visit Vitals  BP (!) 99/58 (BP 1 Location: Left arm, BP Patient Position: Supine)   Pulse 88   Temp 98.7 °F (37.1 °C)   Resp 18   Ht 4' 10\" (1.473 m)   Wt 56.8 kg (125 lb 4.8 oz)   LMP 2020   SpO2 97%   BMI 26.19 kg/m²        Tmax:  Temp (24hrs), Av.5 °F (36.9 °C), Min:98.4 °F (36.9 °C), Max:98.7 °F (37.1 °C)      PHYSICAL EXAM:  General: WD, WN. Alert, cooperative, no acute distress    EENT:  EOMI. Anicteric sclerae. MMM  Resp:  CTA bilaterally, no wheezing or rales. No accessory muscle use  CV:  Regular  rhythm,  No edema  GI:  Soft, Non distended, Non tender. +Bowel sounds  Neurologic:  Alert and oriented X 3, normal speech,   Psych:   Good insight. Not anxious nor agitated  Skin:  No rashes.   No jaundice    Labs:   Lab Results   Component Value Date/Time    WBC 7.0 10/31/2020 12:41 AM    HGB 9.2 (L) 10/31/2020 12:41 AM    HCT 27.3 (L) 10/31/2020 12:41 AM    PLATELET 213  12:41 AM    MCV 91.0 10/31/2020 12:41 AM     Lab Results   Component Value Date/Time    Sodium 138 2020 04:01 AM    Potassium 3.3 (L) 2020 04:01 AM    Chloride 112 (H) 2020 04:01 AM    CO2 19 (L) 2020 04:01 AM    Anion gap 7 2020 04:01 AM    Glucose 72 2020 04:01 AM    BUN 6 2020 04:01 AM    Creatinine 0.30 (L) 2020 04:01 AM    BUN/Creatinine ratio 20 2020 04:01 AM    GFR est AA >60 2020 04:01 AM    GFR est non-AA >60 2020 04:01 AM    Calcium 8.5 2020 04:01 AM Bilirubin, total 0.8 11/03/2020 04:01 AM    Alk. phosphatase 115 11/03/2020 04:01 AM    Protein, total 6.6 11/03/2020 04:01 AM    Albumin 2.3 (L) 11/03/2020 04:01 AM    Globulin 4.3 (H) 11/03/2020 04:01 AM    A-G Ratio 0.5 (L) 11/03/2020 04:01 AM    ALT (SGPT) 43 11/03/2020 04:01 AM             Assessment and Plan     Acute respiratory failure secondary to COVID 19 and PNA  - COVID 19 (+): outside facility    CXR: several scattered pulmonary opacities bilaterally may represent atypical pneumonia. Complete total 5 days of rocephin and Zithromax     Completed IV remdesivir as of 11/3    Continue with Decadron, Vit C, and zinc    Weaned off from supplemental O2      Nausea r/t gestation (resolved)  - antiemetic PRN    OB following      ID team signing off. Please contact us with any questions.     Eva Richey Ahr, NP

## 2020-11-03 NOTE — PROGRESS NOTES
[facility]                                                                                          Hospitalist Progress Note  Khadijah Talavera MD  Answering service: [phone] from in house phone        Date of Service:  11/3/2020  NAME:  Frank Lindsay  :  1990  MRN:  [id number]      Please note that this dictation was completed with [employer], the computer voice recognition software. Quite often unanticipated grammatical, syntax, homophones, and other interpretive errors are inadvertently transcribed by the computer software. Please disregard these errors. Please excuse any errors that have escaped final proofreading. Admission Summary:   pt w + covid status as per note:  \"26year-old female G2, P1 at 25 weeks presents with complaints of 1 day cough, pleuritic chest pain, shortness of breath.  Patient reports that she tested positive for COVID-19 on 2020.  At that time she had no symptoms. Dunia Morris father-in-law had recently tested positive for coronavirus\" pt also notes of the 6 people in her house hold all + covid except her daughter 2 yrs age is neg,  Pt is a non smoker, no alcohol. Pt deneis other symptoms, no rash, no n/v, no neck pain. Interval history / Subjective:     PATIENT is doing good! She has no complaints today  Looks really good on examination    Can be discharged from my standpoint after the last dose of remdesivir  Needs 10 days of isolation from the day of symptom onset       Assessment & Plan:     Covid positive pneumonia  Started on remdesivir and the Decadron  ID and pulmonology on board. On empiric antibiotics ceftriaxone and azithromycin  Lovenox per protocol. Zinc sulfate and vitamin C per protocol.   Daily Covid labs Legionella and strep pneumoniae antigen test  Stable labs so far  Patient refused convalescent plasma on        25-week pregnancy  OB on board  Daily NST  FM consult  Rest per OB          Code status: full  DVT prophylaxis: lovenox     Care Plan discussed with: Patient/Family  Anticipated Disposition: Home w/Family  Anticipated Discharge: Greater than 48 hours     Hospital Problems  Date Reviewed: 1/9/2019          Codes Class Noted POA    COVID-19 affecting pregnancy in third trimester ICD-10-CM: O98.513, U07.1  ICD-9-CM: 647.63, 079.89  10/29/2020 Unknown                Review of Systems:   A comprehensive review of systems was negative except for that written in the HPI. Vital Signs:    Last 24hrs VS reviewed since prior progress note. Most recent are:  Visit Vitals  BP (!) 92/54 (BP 1 Location: Left arm, BP Patient Position: Sitting)   Pulse 71   Temp 97.9 °F (36.6 °C)   Resp 18   Ht 4' 10\" (1.473 m)   Wt 56.8 kg (125 lb 4.8 oz)   SpO2 97%   BMI 26.19 kg/m²       No intake or output data in the 24 hours ending 11/03/20 1404     Physical Examination:             GEN: awake, alert, non-diaphoretic, no psychomotor agitation, no acute distress  HEENT: Atraumatic, normocephalic, no rashes noted, no facial lesions noted. Eyes: No redness, no discharge, no swelling. No drainage observed from nose. CARDIOPULMONARY: no increased respiratory effort, speaking in clear sentences, I:E ratio WNL  GI: Distended abdomen abdomen/pregnancy  MSK: normal ROM in the neck, upper extremities and lower extremities  SKIN: no lesions, wounds, erythema, or cyanosis noted on face or hands  NEURO: cranial nerves grossly normal, normal speech rate and rhythm, moves both upper extremities equally  PSYCH: appearance, behavior, and attitude - well groomed, pleasant, cooperative                                     Data Review:    Review and/or order of clinical lab test      Labs:     No results for input(s): WBC, HGB, HCT, PLT, HGBEXT, HCTEXT, PLTEXT, HGBEXT, HCTEXT, PLTEXT in the last 72 hours.   Recent Labs     11/03/20  0401 11/02/20  0543 11/01/20  0221    139 139   K 3.3* 3.4* 3.4*   * 113* 113*   CO2 19* 19* 20*   BUN 6 4* 4*   CREA 0.30* 0.22* 0.25*   GLU 72 80 77   CA 8.5 7.6* 7.7*     Recent Labs     11/03/20  0401 11/02/20  0543 11/01/20  0221   ALT 43 36 26    101 94   TBILI 0.8 0.6 0.4   TP 6.6 6.1* 6.1*   ALB 2.3* 2.1* 2.1*   GLOB 4.3* 4.0 4.0     No results for input(s): INR, PTP, APTT, INREXT, INREXT in the last 72 hours. No results for input(s): FE, TIBC, PSAT, FERR in the last 72 hours. No results found for: FOL, RBCF   No results for input(s): PH, PCO2, PO2 in the last 72 hours. No results for input(s): CPK, CKNDX, TROIQ in the last 72 hours.     No lab exists for component: CPKMB  Lab Results   Component Value Date/Time    Cholesterol, total 162 08/19/2020 09:57 AM    HDL Cholesterol 67 08/19/2020 09:57 AM    LDL, calculated 65.6 08/19/2020 09:57 AM    Triglyceride 147 08/19/2020 09:57 AM     No results found for: [facility]  Lab Results   Component Value Date/Time    Color YELLOW/STRAW 10/29/2020 03:26 PM    Appearance CLEAR 10/29/2020 03:26 PM    Specific gravity <1.005 10/29/2020 03:26 PM    pH (UA) 8.0 10/29/2020 03:26 PM    Protein Negative 10/29/2020 03:26 PM    Glucose Negative 10/29/2020 03:26 PM    Ketone TRACE (A) 10/29/2020 03:26 PM    Bilirubin Negative 10/29/2020 03:26 PM    Urobilinogen 1.0 10/29/2020 03:26 PM    Nitrites Negative 10/29/2020 03:26 PM    Leukocyte Esterase TRACE (A) 10/29/2020 03:26 PM    Epithelial cells MODERATE (A) 10/29/2020 03:26 PM    Bacteria Negative 10/29/2020 03:26 PM    WBC 0-4 10/29/2020 03:26 PM    RBC 0-5 10/29/2020 03:26 PM         Medications Reviewed:     Current Facility-Administered Medications   Medication Dose Route Frequency    prenatal vit-iron fumarate-fa (PRENATAL PLUS with IRON) tablet 1 Tab  1 Tab Oral DAILY    potassium chloride SR (KLOR-CON 10) tablet 40 mEq  40 mEq Oral DAILY    guaiFENesin-dextromethorphan (ROBITUSSIN DM) 100-10 mg/5 mL syrup 5 mL  5 mL Oral Q6H PRN    ondansetron (ZOFRAN) injection 4 mg  4 mg IntraVENous Q8H PRN    dexamethasone (DECADRON) 4 mg/mL injection 6 mg  6 mg IntraVENous Q24H    enoxaparin (LOVENOX) injection 30 mg  30 mg SubCUTAneous Q12H    zinc sulfate (ZINCATE) 220 (50) mg capsule 1 Cap  1 Cap Oral DAILY    ascorbic acid (vitamin C) (VITAMIN C) tablet 500 mg  500 mg Oral BID     ______________________________________________________________________  EXPECTED LENGTH OF STAY: 3d 7h  ACTUAL LENGTH OF STAY:          Kenneth Wallace MD

## 2020-11-03 NOTE — PROGRESS NOTES
Problem: Airway Clearance - Ineffective  Goal: Achieve or maintain patent airway  Outcome: Progressing Towards Goal     Problem: Nutrition Deficits  Goal: Optimize nutrtional status  Outcome: Progressing Towards Goal     Problem: Risk for Fluid Volume Deficit  Goal: Maintain normal heart rhythm  Outcome: Progressing Towards Goal     Problem: Falls - Risk of  Goal: *Absence of Falls  Description: Document Angel Fall Risk and appropriate interventions in the flowsheet.   Outcome: Progressing Towards Goal  Note: Fall Risk Interventions:            Medication Interventions: Patient to call before getting OOB

## 2020-11-03 NOTE — ACP (ADVANCE CARE PLANNING)
requested for Advance Directive (AMD) consult. She recalled speaking to the CM earlier concerning end of life treatment. Patient would like to review AMD material prior to making a decision concerning completion of AMD.  Provided a blank copy of AMD, a copy of the booklet, \"Your Right to Decide,\" and information card describing availability of  and pastoral care services. Gave material to patient's nurse who will give this material to the patient during the next visit. Asked patient to have her nurse contact the  should she wish to complete AMD during this hospitalization. Visited by Rev. Tone Martinez MDiv, [facility], [facility]   paging service: [phone])

## 2020-11-03 NOTE — PROGRESS NOTES
Clinical Pharmacy Note: IV to PO Automatic Conversion  Please note: Hillard Dandy M Rais medication(s) (dexamethasone) has/have been changed from IV to PO based on the following critiera:    Patient is taking scheduled oral medications  Patient is tolerating tube feeds at goal rate or a full liquid, soft or regular diet    This IV to PO conversion is based on the [employer] approved automatic conversion policy for eligible patients. Please call with questions.

## 2020-11-03 NOTE — PROGRESS NOTES
Spiritual Care Assessment/Progress Note  [facility]      NAME: Teddy Mauricio      MRN: [id number]  AGE: 26 y.o.  SEX: female  Taoism Affiliation: Non Mandaeism   Language: English     11/3/2020     Total Time (in minutes): 10     Spiritual Assessment begun in [facility] through conversation with:         [x]Patient        [] Family    [] Friend(s)        Reason for Consult: Initial/Spiritual assessment, patient floor, Advance medical directive consult     Spiritual beliefs: (Please include comment if needed)     [x] Identifies with a rosemarie tradition:   Madai Pani[place]      [] Supported by a rosemarie community:            [] Claims no spiritual orientation:           [] Seeking spiritual identity:                [] Adheres to an individual form of spirituality:           [] Not able to assess:                           Identified resources for coping:      [] Prayer                               [] Music                  [] Guided Imagery     [x] Family/friends                 [] Pet visits     [] Devotional reading                         [] Unknown     [] Other:                                               Interventions offered during this visit: (See comments for more details)    Patient Interventions: Advance medical directive consult, Affirmation of emotions/emotional suffering, Affirmation of rosemarie, Coping skills reviewed/reinforced, Iconic (affirming the presence of God/Higher Power)           Plan of Care:     [] Support spiritual and/or cultural needs    [x] Support AMD and/or advance care planning process      [] Support grieving process   [] Coordinate Rites and/or Rituals    [] Coordination with community clergy   [] No spiritual needs identified at this time   [] Detailed Plan of Care below (See Comments)  [] Make referral to Music Therapy  [] Make referral to Pet Therapy     [] Make referral to Addiction services  [] Make referral to [facility]  [] Make referral to Spiritual Care Partner  [] No future visits requested        [x] Follow up visits as needed     Comments:  visit for initial spiritual assessment and Advance Directive (AMD) consult. Patient under Covid-19 isolation precautions. Spoke to patient on her bedside telephone. Patient states she is feeling okay this afternoon, sounds a little sleepy, says she is feeling tired. Provided spiritual presence and listening as she spoke of her current thoughts, feelings, and concerns. Described good family support and says she is able to contact her spouse.  requested for Advance Directive (AMD) consult. She recalled speaking to the CM earlier concerning end of life treatment. Patient would like to review AMD material prior to making a decision concerning completion of AMD.  Provided a blank copy of AMD, a copy of the booklet, \"Your Right to Decide,\" and information card describing availability of  and pastoral care services. Gave material to patient's nurse who will give this material to the patient during the next visit. Asked patient to have her nurse contact the  should she wish to complete AMD during this hospitalization. The patient appeared comforted as a result of this conversation and expressed gratitude for this visit. Visited by Rev. Cullen Cotto MDiv, [facility], [facility]   paging service: [phone])

## 2020-11-03 NOTE — PROGRESS NOTES
Day 4   at 26.0 weeks    COVID pneumonia doing ok on CTX, Azithro, Remdesivir (course completes 11/3), Decadron, Vit c and zinc    NST today:  140-150's +accels, reassuring    Cont current care

## 2020-11-04 VITALS
WEIGHT: 125.3 LBS | BODY MASS INDEX: 26.3 KG/M2 | RESPIRATION RATE: 16 BRPM | TEMPERATURE: 97.9 F | DIASTOLIC BLOOD PRESSURE: 54 MMHG | HEIGHT: 58 IN | HEART RATE: 78 BPM | OXYGEN SATURATION: 96 % | SYSTOLIC BLOOD PRESSURE: 94 MMHG

## 2020-11-04 LAB
ALBUMIN SERPL-MCNC: 2.3 G/DL (ref 3.5–5)
ALBUMIN/GLOB SERPL: 0.5 {RATIO} (ref 1.1–2.2)
ALP SERPL-CCNC: 115 U/L (ref 45–117)
ALT SERPL-CCNC: 52 U/L (ref 12–78)
ANION GAP SERPL CALC-SCNC: 7 MMOL/L (ref 5–15)
AST SERPL-CCNC: 52 U/L (ref 15–37)
BILIRUB SERPL-MCNC: 0.8 MG/DL (ref 0.2–1)
BUN SERPL-MCNC: 5 MG/DL (ref 6–20)
BUN/CREAT SERPL: 15 (ref 12–20)
CALCIUM SERPL-MCNC: 8.1 MG/DL (ref 8.5–10.1)
CHLORIDE SERPL-SCNC: 109 MMOL/L (ref 97–108)
CO2 SERPL-SCNC: 19 MMOL/L (ref 21–32)
CREAT SERPL-MCNC: 0.34 MG/DL (ref 0.55–1.02)
D DIMER PPP FEU-MCNC: 1.19 MG/L FEU (ref 0–0.65)
GLOBULIN SER CALC-MCNC: 4.3 G/DL (ref 2–4)
GLUCOSE SERPL-MCNC: 99 MG/DL (ref 65–100)
IL6 SERPL-MCNC: 24.6 PG/ML (ref 0–13)
POTASSIUM SERPL-SCNC: 3.6 MMOL/L (ref 3.5–5.1)
PROT SERPL-MCNC: 6.6 G/DL (ref 6.4–8.2)
SODIUM SERPL-SCNC: 135 MMOL/L (ref 136–145)

## 2020-11-04 PROCEDURE — 80053 COMPREHEN METABOLIC PANEL: CPT

## 2020-11-04 PROCEDURE — 36415 COLL VENOUS BLD VENIPUNCTURE: CPT

## 2020-11-04 PROCEDURE — 74011250637 HC RX REV CODE- 250/637: Performed by: INTERNAL MEDICINE

## 2020-11-04 PROCEDURE — 74011250637 HC RX REV CODE- 250/637: Performed by: OBSTETRICS & GYNECOLOGY

## 2020-11-04 PROCEDURE — 85379 FIBRIN DEGRADATION QUANT: CPT

## 2020-11-04 PROCEDURE — 74011250636 HC RX REV CODE- 250/636: Performed by: FAMILY MEDICINE

## 2020-11-04 RX ORDER — ASCORBIC ACID 500 MG
500 TABLET ORAL 2 TIMES DAILY
Qty: 60 TAB | Refills: 1 | Status: SHIPPED | OUTPATIENT
Start: 2020-11-04 | End: 2021-04-15 | Stop reason: ALTCHOICE

## 2020-11-04 RX ORDER — DEXAMETHASONE 6 MG/1
6 TABLET ORAL DAILY
Qty: 5 TAB | Refills: 0 | Status: SHIPPED | OUTPATIENT
Start: 2020-11-04 | End: 2020-11-09

## 2020-11-04 RX ADMIN — POTASSIUM CHLORIDE 40 MEQ: 750 TABLET, FILM COATED, EXTENDED RELEASE ORAL at 08:41

## 2020-11-04 RX ADMIN — ZINC SULFATE 220 MG (50 MG) CAPSULE 1 CAPSULE: CAPSULE at 08:40

## 2020-11-04 RX ADMIN — Medication 1 TABLET: at 08:40

## 2020-11-04 RX ADMIN — DEXAMETHASONE 6 MG: 4 TABLET ORAL at 08:41

## 2020-11-04 RX ADMIN — OXYCODONE HYDROCHLORIDE AND ACETAMINOPHEN 500 MG: 500 TABLET ORAL at 08:41

## 2020-11-04 NOTE — PROGRESS NOTES
Problem: Breathing Pattern - Ineffective  Goal: Ability to achieve and maintain a regular respiratory rate  Outcome: Progressing Towards Goal

## 2020-11-04 NOTE — PROGRESS NOTES
11/4/2020      RE: Kalpesh Shaikh      To Whom it May Concern: This is to certify that Kalpesh Shaikh may may return to work on 11/18/20. She was hospitalized and  treated for COVID from 10/29-11/4/20 and must quarantine for 2 weeks before returning to work. Thank you for your assistance in this matter.     Sincerely,      Letitia Delgado MD

## 2020-11-04 NOTE — DISCHARGE SUMMARY
Antepartum Discharge Summary     Name: Ophelia James MRN: [id number]  SSN: [id number]    YOB: 1990  Age: 26 y.o. Sex: female      Allergies: Patient has no known allergies. Admit Date: 10/29/2020    Discharge Date: 2020     Admitting Physician: Aixa Clark MD     Attending Physician:  Dakota Temple MD     * Admission Diagnoses: [id number] affecting pregnancy in third trimester [O98.513, U07.1]    * Discharge Diagnoses:   Hospital Problems as of 2020 Date Reviewed: 2019          Codes Class Noted - Resolved POA    COVID-19 affecting pregnancy in third trimester ICD-10-CM: O98.513, U07.1  ICD-9-CM: 647.63, 079.89  10/29/2020 - Present Unknown             Lab Results   Component Value Date/Time    ABO/Rh(D) O POSITIVE 10/29/2020 03:26 PM      Immunization History   Administered Date(s) Administered    Hep B Vaccine 2013, 10/23/2013    Influenza Vaccine 2014    MMR 2013, 10/23/2013    Td 2013    Tdap 2014       * Discharge Condition: good, improved and stable    * Procedures: none  * No surgery found [facility] Hospital Course:    - Ms Timo Lyons was admitted for COVID pneumonia. She received antibiotics, remdisivir, and decadron and improved very well and deemed stable for discharge to finish decadron course for a total of 10 days. She was managed and followed by medicine hospitalist, ID, and pulmonologist. No OB issues during hospitalization and reassuring  testing with daily NSTs throughout hospitalization. She needs to quarantine for a total of 10 days from start of symptoms and be out of work for 2 weeks from today. She can follow up with PCP and OB in 1-2 weeks. * Disposition: Home    Discharge Medications:   Current Discharge Medication List      START taking these medications    Details   ascorbic acid, vitamin C, (VITAMIN C) 500 mg tablet Take 1 Tab by mouth two (2) times a day.   Qty: 60 Tab, Refills: 1      dexAMETHasone (DECADRON) 6 mg tablet Take 1 Tab by mouth daily for 5 days. Qty: 5 Tab, Refills: 0         CONTINUE these medications which have NOT CHANGED    Details   prenatal vit-calcium-iron-fa (PRENATAL PLUS with CALCIUM) 27 mg iron- 1 mg tab Take 1 Tab by mouth daily. Qty: 90 Tab, Refills: 1    Associated Diagnoses: 8 weeks gestation of pregnancy             * Follow-up Care/Patient Instructions: Activity: Activity as tolerated  Diet: Regular Diet  Wound Care: None needed    Follow-up Information     Follow up With Specialties Details Why Contact Info    Marguerite Cook MD Family Medicine   [address] [place] [address]      Yon Hebert MD Obstetrics & Gynecology, Gynecology, Obstetrics Schedule an appointment as soon as possible for a visit in 1 week OB follow up, rosaura notify office that you were treated fro [facility] 13  [phone]          .

## 2020-11-04 NOTE — PROGRESS NOTES
NST reviewed. No OB complaints reported from L&D nurse. Baseline 140, mod variability, one 10 x 10 accel noted, decels absent  TOCO 1 contraction noted. Remdesivir completed today. Likely discharge tomorrow. Will check with medicine/ID regarding course of Decadron.

## 2020-11-04 NOTE — ROUTINE PROCESS
[facility] follow-up PCP transitional care appointment has been scheduled with Dr. Blaine Armando for Nov 9 @ 1PM. Pending patient discharge.   Edel Davis, Care Management Specialist.

## 2020-11-04 NOTE — PROGRESS NOTES
Verbal shift change report given to Gail Good RN (oncoming nurse) by Alfredo Cardoza (offgoing nurse). Report included the following information SBAR, Kardex, Intake/Output, MAR, Recent Results and Med Rec Status.

## 2020-11-04 NOTE — PROGRESS NOTES
[facility]  Hospitalist Group                                                                                          Hospitalist Progress Note  Lobo Hathaway MD  Answering service: [phone] OR [phone] from in house phone        Date of Service:  2020  NAME:  Mallory Samano  :  1990  MRN:  [id number]      Please note that this dictation was completed with [employer], the computer voice recognition software. Quite often unanticipated grammatical, syntax, homophones, and other interpretive errors are inadvertently transcribed by the computer software. Please disregard these errors. Please excuse any errors that have escaped final proofreading. Admission Summary:   pt w + covid status as per note:  \"26year-old female G2, P1 at 25 weeks presents with complaints of 1 day cough, pleuritic chest pain, shortness of breath.  Patient reports that she tested positive for COVID-19 on 2020.  At that time she had no symptoms. Valeria Peterson father-in-law had recently tested positive for coronavirus\" pt also notes of the 6 people in her house hold all + covid except her daughter 2 yrs age is neg,  Pt is a non smoker, no alcohol. Pt deneis other symptoms, no rash, no n/v, no neck pain. Interval history / Subjective:     PATIENT is doing good! Sitting in bed,no complains and denied sob,chest pain, fever or chills when asked. Assessment & Plan:     Covid positive pneumonia. On room air  Completed remdesivir,receiving Decadron  ID and pulmonology on board. Completed  antibiotics ceftriaxone and azithromycin  Patient refused convalescent plasma on        25-week pregnancy  OB on board  Daily NST  FM consult  Rest per OB        Medically stable for discharge. Per Up todate, '. .. dexamethasone at a dose of 6 mg daily for 10 days or until discharge, whichever is shorter.:       Hospital Problems  Date Reviewed: 2019          Codes Class Noted POA    COVID-19 affecting pregnancy in third trimester ICD-10-CM: O98.513, U07.1  ICD-9-CM: 647.63, 079.89  10/29/2020 Unknown                Review of Systems:   A comprehensive review of systems was negative except for that written in the HPI. Vital Signs:    Last 24hrs VS reviewed since prior progress note. Most recent are:  Visit Vitals  BP (!) 94/54 (BP 1 Location: Left arm, BP Patient Position: At rest)   Pulse 78   Temp 97.9 °F (36.6 °C)   Resp 16   Ht 4' 10\" (1.473 m)   Wt 56.8 kg (125 lb 4.8 oz)   SpO2 96%   BMI 26.19 kg/m²       No intake or output data in the 24 hours ending 11/04/20 1139     Physical Examination:             GEN: awake, alert, non-diaphoretic, no psychomotor agitation, no acute distress  HEENT: Atraumatic, normocephalic, no rashes noted, no facial lesions noted. Eyes: No redness, no discharge, no swelling. No drainage observed from nose. CARDIOPULMONARY: no increased respiratory effort, speaking in clear sentences, I:E ratio WNL  GI: Distended abdomen abdomen/pregnancy  MSK: normal ROM in the neck, upper extremities and lower extremities  SKIN: no lesions, wounds, erythema, or cyanosis noted on face or hands  NEURO: cranial nerves grossly normal, normal speech rate and rhythm, moves both upper extremities equally  PSYCH: appearance, behavior, and attitude - well groomed, pleasant, cooperative                                     Data Review:    Review and/or order of clinical lab test      Labs:     No results for input(s): WBC, HGB, HCT, PLT, HGBEXT, HCTEXT, PLTEXT, HGBEXT, HCTEXT, PLTEXT in the last 72 hours.   Recent Labs     11/04/20 0200 11/03/20  0401 11/02/20  0543   * 138 139   K 3.6 3.3* 3.4*   * 112* 113*   CO2 19* 19* 19*   BUN 5* 6 4*   CREA 0.34* 0.30* 0.22*   GLU 99 72 80   CA 8.1* 8.5 7.6*     Recent Labs     11/04/20 0200 11/03/20  0401 11/02/20  0543   ALT 52 43 36    115 101   TBILI 0.8 0.8 0.6   TP 6.6 6.6 6.1*   ALB 2.3* 2.3* 2.1*   GLOB 4.3* 4.3* 4.0     No results for input(s): INR, PTP, APTT, INREXT, INREXT in the last 72 hours. No results for input(s): FE, TIBC, PSAT, FERR in the last 72 hours. No results found for: FOL, RBCF   No results for input(s): PH, PCO2, PO2 in the last 72 hours. No results for input(s): CPK, CKNDX, TROIQ in the last 72 hours.     No lab exists for component: CPKMB  Lab Results   Component Value Date/Time    Cholesterol, total 162 08/19/2020 09:57 AM    HDL Cholesterol 67 08/19/2020 09:57 AM    LDL, calculated 65.6 08/19/2020 09:57 AM    Triglyceride 147 08/19/2020 09:57 AM     No results found for: [facility]  Lab Results   Component Value Date/Time    Color YELLOW/STRAW 10/29/2020 03:26 PM    Appearance CLEAR 10/29/2020 03:26 PM    Specific gravity <1.005 10/29/2020 03:26 PM    pH (UA) 8.0 10/29/2020 03:26 PM    Protein Negative 10/29/2020 03:26 PM    Glucose Negative 10/29/2020 03:26 PM    Ketone TRACE (A) 10/29/2020 03:26 PM    Bilirubin Negative 10/29/2020 03:26 PM    Urobilinogen 1.0 10/29/2020 03:26 PM    Nitrites Negative 10/29/2020 03:26 PM    Leukocyte Esterase TRACE (A) 10/29/2020 03:26 PM    Epithelial cells MODERATE (A) 10/29/2020 03:26 PM    Bacteria Negative 10/29/2020 03:26 PM    WBC 0-4 10/29/2020 03:26 PM    RBC 0-5 10/29/2020 03:26 PM         Medications Reviewed:     Current Facility-Administered Medications   Medication Dose Route Frequency    prenatal vit-iron fumarate-fa (PRENATAL PLUS with IRON) tablet 1 Tab  1 Tab Oral DAILY    dexAMETHasone (DECADRON) tablet 6 mg  6 mg Oral DAILY    potassium chloride SR (KLOR-CON 10) tablet 40 mEq  40 mEq Oral DAILY    guaiFENesin-dextromethorphan (ROBITUSSIN DM) 100-10 mg/5 mL syrup 5 mL  5 mL Oral Q6H PRN    ondansetron (ZOFRAN) injection 4 mg  4 mg IntraVENous Q8H PRN    enoxaparin (LOVENOX) injection 30 mg  30 mg SubCUTAneous Q12H    zinc sulfate (ZINCATE) 220 (50) mg capsule 1 Cap  1 Cap Oral DAILY    ascorbic acid (vitamin C) (VITAMIN C) tablet 500 mg  500 mg Oral BID ______________________________________________________________________  EXPECTED LENGTH OF STAY: 3d 7h  ACTUAL LENGTH OF STAY:          6                 Orjamie Noriega MD

## 2020-11-04 NOTE — PROGRESS NOTES
Discharge instructions reviewed with patient and all questions were answered. Pt is still reviewing AMD information and will call with any questions/to submit it. Pt was given two scripts; one for decadron and the other for vitamin C. Pt was also given a work note. Pt  will  pt at main entrance. Pt will be brought to main entrance with belongings via wheelchair by tech.

## 2020-11-04 NOTE — DISCHARGE INSTRUCTIONS
Patient Education        10 Things to Do When You Have COVID-19    Stay home. Don't go to school, work, or public areas. And don't use public transportation, ride-shares, or taxis unless you have no choice. Leave your home only if you need to get medical care. But call the doctor's office first so they know you're coming. And wear a cloth face cover. Ask before leaving isolation. Talk with your doctor or other health professional about when it will be safe for you to leave isolation. Wear a cloth face cover when you are around other people. It can help stop the spread of the virus when you cough or sneeze. Limit contact with people in your home. If possible, stay in a separate bedroom and use a separate bathroom. Avoid contact with pets and other animals. If possible, have a friend or family member care for them while you're sick. Cover your mouth and nose with a tissue when you cough or sneeze. Then throw the tissue in the trash right away. Wash your hands often, especially after you cough or sneeze. Use soap and water, and scrub for at least 20 seconds. If soap and water aren't available, use an alcohol-based hand . Don't share personal household items. These include bedding, towels, cups and glasses, and eating utensils. Clean and disinfect your home every day. Use household  or disinfectant wipes or sprays. Take special care to clean things that you grab with your hands. These include doorknobs, remote controls, phones, and handles on your refrigerator and microwave. And don't forget countertops, tabletops, bathrooms, and computer keyboards. Take acetaminophen (Tylenol) to relieve fever and body aches. Read and follow all instructions on the label. Current as of: July 10, 2020               Content Version: 12.6  © 2006-2020 [employer].    Care instructions adapted under license by [employer] (which disclaims liability or warranty for this information). If you have questions about a medical condition or this instruction, always ask your healthcare professional. [place] [address] any warranty or liability for your use of this information. Patient Education        ArabellaRandal Ortiz After Treatment for COVID-19: Care Instructions  Overview     You are being sent home from the hospital after being treated for COVID-19. Being in the hospital can be hard, especially if you've been in the intensive care unit (ICU). Even though you're going home, you probably don't feel well yet. Healing from COVID-19 takes time. You may feel very tired for weeks or months afterward, especially if you were on a ventilator. It will take time to get back to your old level of activity. Some people may have long-lasting health problems. But most people can look forward to feeling a little better every day. If you were on a ventilator, your throat may be sore and your voice hoarse or raspy for a while. After leaving the hospital, some people have feelings of anxiety and depression. They may have nightmares. Or in their mind they may relive events that happened in the hospital (flashbacks). You can always reach out to your doctor if you're having trouble with these symptoms. Your doctor will tell you if you need to isolate yourself at home, and when you can end isolation. Follow-up care is a key part of your treatment and safety. Be sure to make and go to all appointments, and call your doctor if you are having problems. It's also a good idea to know your test results and keep a list of the medicines you take. How can you care for yourself at home? · Get plenty of rest. It can help you feel better. · Be kind to yourself if it's taking longer than you expected to feel better. You've been through a stressful time. · Get up and walk around every hour or two while you're resting.  Slowly increase your activity as you start to feel better. · Eat healthy foods. · Drink plenty of fluids. If you have kidney, heart, or liver disease and have to limit fluids, talk with your doctor before you increase the amount of fluids you drink. · Take acetaminophen (such as Tylenol) to reduce a fever. It may also help with muscle aches. Read and follow all instructions on the label. If you are in isolation after you get home  · Wear a cloth face cover when you are around other people. It can help stop the spread of the virus when you cough or sneeze. · Limit contact with people in your home. If possible, stay in a separate bedroom and use a separate bathroom. · If you have to leave home, avoid crowds and try to stay at least 6 feet away from other people. · Avoid contact with pets and other animals. · Cover your mouth and nose with a tissue when you cough or sneeze. Then throw it in the trash right away. · Wash your hands often, especially after you cough or sneeze. Use soap and water, and scrub for at least 20 seconds. If soap and water aren't available, use an alcohol-based hand . · Don't share personal household items. These include bedding, towels, cups and glasses, and eating utensils. · [address] in the warmest water allowed for the fabric type, and dry it completely. It's okay to wash other people's laundry with yours. · Clean and disinfect your home every day. Use household  and disinfectant wipes or sprays. Take special care to clean things that you grab with your hands. These include doorknobs, remote controls, phones, and handles on your refrigerator and microwave. And don't forget countertops, tabletops, bathrooms, and computer keyboards. When should you call for help? Call 911 anytime you think you may need emergency care. For example, call if you have life-threatening symptoms, such as:    · You have severe trouble breathing.  (You can't talk at all.)     · You have constant chest pain or pressure.     · You are severely dizzy or lightheaded.     · You are confused or can't think clearly.     · Your face and lips have a blue color.     · You passed out (lost consciousness) or are very hard to wake up. Call your doctor now or seek immediate medical care if:    · You have moderate trouble breathing. (You can't speak a full sentence.)     · You are coughing up blood (more than about 1 teaspoon).     · You have signs of low blood pressure. These include feeling lightheaded; being too weak to stand; and having cold, pale, clammy skin. Watch closely for changes in your health, and be sure to contact your doctor if:    · Your symptoms get worse.     · You are not getting better as expected.     · You have new or worse symptoms of anxiety, depression, nightmares, or flashbacks. Call before you go to the doctor's office. Follow their instructions. And wear a cloth face cover. Current as of: July 10, 2020               Content Version: 12.6  © 2006-2020 [employer]. Care instructions adapted under license by [employer] (which disclaims liability or warranty for this information). If you have questions about a medical condition or this instruction, always ask your healthcare professional. [address] any warranty or liability for your use of this information.

## 2020-11-09 ENCOUNTER — VIRTUAL VISIT (OUTPATIENT)
Dept: FAMILY MEDICINE CLINIC | Age: 30
End: 2020-11-09
Payer: MEDICAID

## 2020-11-09 DIAGNOSIS — O98.513 COVID-19 AFFECTING PREGNANCY IN THIRD TRIMESTER: Primary | ICD-10-CM

## 2020-11-09 DIAGNOSIS — U07.1 COVID-19 AFFECTING PREGNANCY IN THIRD TRIMESTER: Primary | ICD-10-CM

## 2020-11-09 DIAGNOSIS — Z09 HOSPITAL DISCHARGE FOLLOW-UP: ICD-10-CM

## 2020-11-09 PROCEDURE — 99214 OFFICE O/P EST MOD 30 MIN: CPT | Performed by: FAMILY MEDICINE

## 2020-11-09 NOTE — PROGRESS NOTES
Margie Jalloh is a 26 y.o. female who was seen by synchronous (real-time) audio-video technology on 2020 for Hospital Follow Up        [address] [place] [address]:   Diagnoses and all orders for this visit:    1. COVID-19 affecting pregnancy in third trimester    2. Hospital discharge follow-up        I spent at least 25 minutes on this visit with this established patient. She needs to quarantine for a total of 10 days from start of symptoms and be out of work for 2 weeks   Recommended to get flu shot after 2 weeks as she is still on steroid,will be finishing tomorrow    Subjective:   DOA 10/29/2020  DOD 2020     Ms Dillon Vallejo was admitted for COVID pneumonia. She presented to ED with cough and shortness of breath. She already had exposure to COVID before and was tested positive for COVID. her Xray showed bilateral opacities, c/w atypical pneumonia. She was admitted due to her increased risk associated with pregnancy. She received antibiotics, remdisivir, and decadron and improved very well and deemed stable for discharge to finish decadron course for a total of 10 days. She was managed and followed by medicine hospitalist, ID, and pulmonologist. No OB issues during hospitalization and reassuring  testing with daily NSTs throughout hospitalization. She has follow up scheduled with Ob next week  Prior to Admission medications    Medication Sig Start Date End Date Taking? Authorizing Provider   ascorbic acid, vitamin C, (VITAMIN C) 500 mg tablet Take 1 Tab by mouth two (2) times a day. 20  Yes Tiera Fagan MD   prenatal vit-calcium-iron-fa (PRENATAL PLUS with CALCIUM) 27 mg iron- 1 mg tab Take 1 Tab by mouth daily. 20  Yes Isi Degroot MD   dexAMETHasone (DECADRON) 6 mg tablet Take 1 Tab by mouth daily for 5 days.  20  Tiera Fagan MD     Patient Active Problem List    Diagnosis Date Noted    COVID-19 affecting pregnancy in third trimester 10/29/2020    Vitamin D deficiency 2019    Chronic pansinusitis 10/04/2018    Menorrhagia 2014    Migraine headache 2014    Failure to gain weight 2014     Current Outpatient Medications   Medication Sig Dispense Refill    ascorbic acid, vitamin C, (VITAMIN C) 500 mg tablet Take 1 Tab by mouth two (2) times a day. 60 Tab 1    prenatal vit-calcium-iron-fa (PRENATAL PLUS with CALCIUM) 27 mg iron- 1 mg tab Take 1 Tab by mouth daily. 90 Tab 1    dexAMETHasone (DECADRON) 6 mg tablet Take 1 Tab by mouth daily for 5 days. 5 Tab 0     No Known Allergies  Past Medical History:   Diagnosis Date    COVID-19 virus detected     Migraine     since age 11 years     Past Surgical History:   Procedure Laterality Date    HX  SECTION  2016     Family History   Problem Relation Age of Onset    No Known Problems Mother     No Known Problems Father      Social History     Tobacco Use    Smoking status: Never Smoker    Smokeless tobacco: Never Used   Substance Use Topics    Alcohol use: No     Alcohol/week: 0.0 standard drinks       ROS    Objective:   No flowsheet data found.      [INSTRUCTIONS:  \"[x]\" Indicates a positive item  \"[]\" Indicates a negative item  -- DELETE ALL ITEMS NOT EXAMINED]    Constitutional: [x] Appears well-developed and well-nourished [x] No apparent distress      [] Abnormal -   Gravid abdomen    Mental status: [x] Alert and awake  [x] Oriented to person/place/time [x] Able to follow commands    [] Abnormal -     Eyes:   EOM    [x]  Normal    [] Abnormal -   Sclera  [x]  Normal    [] Abnormal -          Discharge [x]  None visible   [] Abnormal -     HENT: [x] Normocephalic, atraumatic  [] Abnormal -   [x] Mouth/Throat: Mucous membranes are moist    External Ears [x] Normal  [] Abnormal -    Neck: [x] No visualized mass [] Abnormal -     Pulmonary/Chest: [x] Respiratory effort normal   [x] No visualized signs of difficulty breathing or respiratory distress        [] Abnormal - Musculoskeletal:   [x] Normal gait with no signs of ataxia         [x] Normal range of motion of neck        [] Abnormal -     Neurological:        [x] No Facial Asymmetry (Cranial nerve 7 motor function) (limited exam due to video visit)          [x] No gaze palsy        [] Abnormal -          Skin:        [x] No significant exanthematous lesions or discoloration noted on facial skin         [] Abnormal -            Psychiatric:       [x] Normal Affect [] Abnormal -        [x] No Hallucinations    Other pertinent observable physical exam findings:-        We discussed the expected course, resolution and complications of the diagnosis(es) in detail. Medication risks, benefits, costs, interactions, and alternatives were discussed as indicated. I advised her to contact the office if her condition worsens, changes or fails to improve as anticipated. She expressed understanding with the diagnosis(es) and plan. Kane Howard, who was evaluated through a patient-initiated, synchronous (real-time) audio-video encounter, and/or her healthcare decision maker, is aware that it is a billable service, with coverage as determined by her insurance carrier. She provided verbal consent to proceed: Yes, and patient identification was verified. It was conducted pursuant to the emergency declaration under the [address] [place] [address], [address] [place] [address] authority and the [employer] Act. A caregiver was present when appropriate. Ability to conduct physical exam was limited. I was in the office. The patient was at home. This service was provided through telehealth, between patient Charissa Valle.  Ashlie Arce participating from [place] and Tanya Nam MD from [facility]     I discussed with the patient the nature of our telemedicine visits, that:      I would evaluate the patient and recommend diagnostics and treatments based on my assessment   Our sessions are not being recorded and that personal health information is protected   Our team would provide follow up care in person if/when the patient needs it    Blossom Ferrer MD

## 2020-11-13 ENCOUNTER — OFFICE VISIT (OUTPATIENT)
Dept: URGENT CARE | Age: 30
End: 2020-11-13
Payer: MEDICAID

## 2020-11-13 VITALS — HEART RATE: 71 BPM | OXYGEN SATURATION: 98 % | TEMPERATURE: 98.3 F | RESPIRATION RATE: 14 BRPM

## 2020-11-13 DIAGNOSIS — Z11.59 SCREENING FOR VIRAL DISEASE: Primary | ICD-10-CM

## 2020-11-13 PROCEDURE — 99213 OFFICE O/P EST LOW 20 MIN: CPT | Performed by: NURSE PRACTITIONER

## 2020-11-13 NOTE — PROGRESS NOTES
This patient was seen in [facility] at [address] [place] [address] Urgent Care while in their vehicle due to COVID-19 pandemic with PPE and focused examination in order to decrease community viral transmission. The patient/guardian gave verbal consent to treat. 25 yo F with a hx of hospitalization 10/29-11/4/20 for COVID/pneumonia during pregnancy (EDC per pt 2/8/21). Here for COVID  retest today. Denies any current sx or recent travel. Denies fever, chills, SOB, CP, vomiting, diarrhea, loss of sense of smell/taste. Has occasional residual cough. + fetal movement. Followed by OB at [facility].       Past Medical History:  No date: COVID-19 virus detected  No date: Migraine      Comment:  since age 11 years    Social History    Socioeconomic History      Marital status:       Spouse name: Not on file      Number of children: Not on file      Years of education: Not on file      Highest education level: Not on file    Occupational History      Not on file    Social Needs      Financial resource strain: Not on file      Food insecurity        Worry: Not on file        Inability: Not on file      Transportation needs        Medical: Not on file        Non-medical: Not on file    Tobacco Use      Smoking status: Never Smoker      Smokeless tobacco: Never Used    Substance and Sexual Activity      Alcohol use: No        Alcohol/week: 0.0 standard drinks      Drug use: No      Sexual activity: Yes        Partners: Male    Lifestyle      Physical activity        Days per week: Not on file        Minutes per session: Not on file      Stress: Not on file    Relationships      Social connections        Talks on phone: Not on file        Gets together: Not on file        Attends [employer]al service: Not on file        Active member of club or organization: Not on file        Attends meetings of clubs or organizations: Not on file        Relationship status: Not on file      Intimate partner violence        Fear of current or ex partner: Not on file        Emotionally abused: Not on file        Physically abused: Not on file        Forced sexual activity: Not on file    Other Topics      Concerns:        Not on file    Social History Narrative      Not on file      The history is provided by the patient.         Past Medical History:   Diagnosis Date    COVID-19 virus detected     Migraine     since age 11 years        Past Surgical History:   Procedure Laterality Date    HX  SECTION  2016         Family History   Problem Relation Age of Onset    No Known Problems Mother     No Known Problems Father         Social History     Socioeconomic History    Marital status:      Spouse name: Not on file    Number of children: Not on file    Years of education: Not on file    Highest education level: Not on file   Occupational History    Not on file   Social Needs    Financial resource strain: Not on file    Food insecurity     Worry: Not on file     Inability: Not on file    Transportation needs     Medical: Not on file     Non-medical: Not on file   Tobacco Use    Smoking status: Never Smoker    Smokeless tobacco: Never Used   Substance and Sexual Activity    Alcohol use: No     Alcohol/week: 0.0 standard drinks    Drug use: No    Sexual activity: Yes     Partners: Male   Lifestyle    Physical activity     Days per week: Not on file     Minutes per session: Not on file    Stress: Not on file   Relationships    Social connections     Talks on phone: Not on file     Gets together: Not on file     Attends Taoism service: Not on file     Active member of club or organization: Not on file     Attends meetings of clubs or organizations: Not on file     Relationship status: Not on file    Intimate partner violence     Fear of current or ex partner: Not on file     Emotionally abused: Not on file     Physically abused: Not on file     Forced sexual activity: Not on file   Other Topics Concern    Not on file Social History Narrative    Not on file                ALLERGIES: Patient has no known allergies. Review of Systems   Constitutional: Negative for appetite change, chills, fatigue and fever. HENT: Negative for congestion, facial swelling, sinus pain and trouble swallowing. Eyes: Negative for discharge and redness. Respiratory: Negative for shortness of breath. Occasional nonproductive cough    Cardiovascular: Negative for leg swelling. Gastrointestinal: Negative for diarrhea, nausea and vomiting. Musculoskeletal: Negative for myalgias. Skin: Negative for color change. Neurological: Negative for dizziness, seizures, facial asymmetry, weakness, light-headedness and headaches. Psychiatric/Behavioral: Negative for confusion. There were no vitals filed for this visit. Physical Exam  Constitutional:       General: She is not in acute distress. Appearance: She is well-developed. She is not ill-appearing or diaphoretic. HENT:      Nose: No congestion or rhinorrhea. Neck:      Musculoskeletal: Normal range of motion. Cardiovascular:      Rate and Rhythm: Normal rate. Pulmonary:      Effort: Pulmonary effort is normal.      Breath sounds: Normal breath sounds. Musculoskeletal: Normal range of motion. Skin:     General: Skin is warm. Neurological:      General: No focal deficit present. Mental Status: She is alert and oriented to person, place, and time. Psychiatric:         Mood and Affect: Mood normal.         Behavior: Behavior is cooperative. MDM     Amount and/or Complexity of Data Reviewed:   Clinical lab tests:  Ordered (COVID testing )      ICD-10-CM ICD-9-CM    1. Screening for viral disease  Z11.59 V73.99 NOVEL CORONAVIRUS (COVID-19)     No orders of the defined types were placed in this encounter. No results found for any visits on 11/13/20. The patients condition was discussed with the patient and they understand.   The patient is to follow up with primary care doctor. If signs and symptoms become worse the pt is to go to the ER. The patient is to take medications as prescribed. COVID testing performed. Pt advised to self quarantine per CDC guidelines.      Procedures

## 2020-11-16 LAB — SARS-COV-2, NAA: NOT DETECTED

## 2021-04-15 ENCOUNTER — OFFICE VISIT (OUTPATIENT)
Dept: FAMILY MEDICINE CLINIC | Age: 31
End: 2021-04-15
Payer: MEDICAID

## 2021-04-15 VITALS
HEART RATE: 65 BPM | SYSTOLIC BLOOD PRESSURE: 105 MMHG | RESPIRATION RATE: 14 BRPM | BODY MASS INDEX: 24.56 KG/M2 | WEIGHT: 117 LBS | TEMPERATURE: 98.4 F | DIASTOLIC BLOOD PRESSURE: 60 MMHG | HEIGHT: 58 IN | OXYGEN SATURATION: 99 %

## 2021-04-15 DIAGNOSIS — G43.019 INTRACTABLE MIGRAINE WITHOUT AURA AND WITHOUT STATUS MIGRAINOSUS: ICD-10-CM

## 2021-04-15 DIAGNOSIS — E55.9 VITAMIN D DEFICIENCY: ICD-10-CM

## 2021-04-15 DIAGNOSIS — Z00.00 ENCOUNTER FOR PREVENTIVE HEALTH EXAMINATION: Primary | ICD-10-CM

## 2021-04-15 PROBLEM — U07.1 COVID-19 AFFECTING PREGNANCY IN THIRD TRIMESTER: Status: RESOLVED | Noted: 2020-10-29 | Resolved: 2021-04-15

## 2021-04-15 PROBLEM — O98.513 COVID-19 AFFECTING PREGNANCY IN THIRD TRIMESTER: Status: RESOLVED | Noted: 2020-10-29 | Resolved: 2021-04-15

## 2021-04-15 LAB
25(OH)D3 SERPL-MCNC: 18.9 NG/ML (ref 30–100)
ALBUMIN SERPL-MCNC: 4.3 G/DL (ref 3.5–5)
ALBUMIN/GLOB SERPL: 1 {RATIO} (ref 1.1–2.2)
ALP SERPL-CCNC: 80 U/L (ref 45–117)
ALT SERPL-CCNC: 48 U/L (ref 12–78)
ANION GAP SERPL CALC-SCNC: 5 MMOL/L (ref 5–15)
APPEARANCE UR: CLEAR
AST SERPL-CCNC: 24 U/L (ref 15–37)
BACTERIA URNS QL MICRO: ABNORMAL /HPF
BASOPHILS # BLD: 0.1 K/UL (ref 0–0.1)
BASOPHILS NFR BLD: 1 % (ref 0–1)
BILIRUB SERPL-MCNC: 0.7 MG/DL (ref 0.2–1)
BILIRUB UR QL: NEGATIVE
BUN SERPL-MCNC: 8 MG/DL (ref 6–20)
BUN/CREAT SERPL: 15 (ref 12–20)
CALCIUM SERPL-MCNC: 9 MG/DL (ref 8.5–10.1)
CHLORIDE SERPL-SCNC: 111 MMOL/L (ref 97–108)
CHOLEST SERPL-MCNC: 170 MG/DL
CO2 SERPL-SCNC: 25 MMOL/L (ref 21–32)
COLOR UR: ABNORMAL
CREAT SERPL-MCNC: 0.52 MG/DL (ref 0.55–1.02)
DIFFERENTIAL METHOD BLD: ABNORMAL
EOSINOPHIL # BLD: 0.2 K/UL (ref 0–0.4)
EOSINOPHIL NFR BLD: 3 % (ref 0–7)
EPITH CASTS URNS QL MICRO: ABNORMAL /LPF
ERYTHROCYTE [DISTWIDTH] IN BLOOD BY AUTOMATED COUNT: 19.5 % (ref 11.5–14.5)
EST. AVERAGE GLUCOSE BLD GHB EST-MCNC: 105 MG/DL
GLOBULIN SER CALC-MCNC: 4.2 G/DL (ref 2–4)
GLUCOSE SERPL-MCNC: 84 MG/DL (ref 65–100)
GLUCOSE UR STRIP.AUTO-MCNC: NEGATIVE MG/DL
HBA1C MFR BLD: 5.3 % (ref 4–5.6)
HCT VFR BLD AUTO: 39.2 % (ref 35–47)
HDLC SERPL-MCNC: 54 MG/DL
HDLC SERPL: 3.1 {RATIO} (ref 0–5)
HGB BLD-MCNC: 12.4 G/DL (ref 11.5–16)
HGB UR QL STRIP: NEGATIVE
IMM GRANULOCYTES # BLD AUTO: 0 K/UL (ref 0–0.04)
IMM GRANULOCYTES NFR BLD AUTO: 0 % (ref 0–0.5)
KETONES UR QL STRIP.AUTO: NEGATIVE MG/DL
LDLC SERPL CALC-MCNC: 95.2 MG/DL (ref 0–100)
LEUKOCYTE ESTERASE UR QL STRIP.AUTO: ABNORMAL
LIPID PROFILE,FLP: NORMAL
LYMPHOCYTES # BLD: 2.3 K/UL (ref 0.8–3.5)
LYMPHOCYTES NFR BLD: 31 % (ref 12–49)
MCH RBC QN AUTO: 27.9 PG (ref 26–34)
MCHC RBC AUTO-ENTMCNC: 31.6 G/DL (ref 30–36.5)
MCV RBC AUTO: 88.3 FL (ref 80–99)
MONOCYTES # BLD: 0.5 K/UL (ref 0–1)
MONOCYTES NFR BLD: 6 % (ref 5–13)
NEUTS SEG # BLD: 4.5 K/UL (ref 1.8–8)
NEUTS SEG NFR BLD: 59 % (ref 32–75)
NITRITE UR QL STRIP.AUTO: NEGATIVE
NRBC # BLD: 0 K/UL (ref 0–0.01)
NRBC BLD-RTO: 0 PER 100 WBC
PH UR STRIP: 7 [PH] (ref 5–8)
PLATELET # BLD AUTO: 251 K/UL (ref 150–400)
PMV BLD AUTO: 12 FL (ref 8.9–12.9)
POTASSIUM SERPL-SCNC: 3.9 MMOL/L (ref 3.5–5.1)
PROT SERPL-MCNC: 8.5 G/DL (ref 6.4–8.2)
PROT UR STRIP-MCNC: NEGATIVE MG/DL
RBC # BLD AUTO: 4.44 M/UL (ref 3.8–5.2)
RBC #/AREA URNS HPF: ABNORMAL /HPF (ref 0–5)
SODIUM SERPL-SCNC: 141 MMOL/L (ref 136–145)
SP GR UR REFRACTOMETRY: 1.01 (ref 1–1.03)
TRIGL SERPL-MCNC: 104 MG/DL (ref ?–150)
TSH SERPL DL<=0.05 MIU/L-ACNC: 1.45 UIU/ML (ref 0.36–3.74)
UROBILINOGEN UR QL STRIP.AUTO: 0.2 EU/DL (ref 0.2–1)
VLDLC SERPL CALC-MCNC: 20.8 MG/DL
WBC # BLD AUTO: 7.5 K/UL (ref 3.6–11)
WBC URNS QL MICRO: ABNORMAL /HPF (ref 0–4)

## 2021-04-15 PROCEDURE — 99395 PREV VISIT EST AGE 18-39: CPT | Performed by: FAMILY MEDICINE

## 2021-04-15 RX ORDER — IBUPROFEN 400 MG/1
TABLET ORAL
COMMUNITY
Start: 2021-02-05 | End: 2021-04-15 | Stop reason: ALTCHOICE

## 2021-04-15 RX ORDER — MEDROXYPROGESTERONE ACETATE 150 MG/ML
INJECTION, SUSPENSION INTRAMUSCULAR
COMMUNITY
Start: 2021-03-04 | End: 2021-04-15 | Stop reason: ALTCHOICE

## 2021-04-15 RX ORDER — NAPROXEN 500 MG/1
500 TABLET ORAL
Qty: 60 TAB | Refills: 0 | Status: SHIPPED | OUTPATIENT
Start: 2021-04-15

## 2021-04-15 NOTE — PROGRESS NOTES
Chief Complaint   Patient presents with    Complete Physical     follow up         1. Have you been to the ER, urgent care clinic since your last visit? Hospitalized since your last visit? No    2. Have you seen or consulted any other health care providers outside of the [address] [place] [address] since your last visit? Include any pap smears or colon screening. No    3 most recent PHQ Screens 4/15/2021   Little interest or pleasure in doing things Not at all   Feeling down, depressed, irritable, or hopeless Not at all   Total Score PHQ 2 0         Abuse Screening Questionnaire 4/15/2021   Do you ever feel afraid of your partner? N   Are you in a relationship with someone who physically or mentally threatens you? N   Is it safe for you to go home?  Y          Health Maintenance Due   Topic Date Due    Hepatitis C Screening  Never done    Pneumococcal 0-64 years (1 of 1 - PPSV23) Never done    COVID-19 Vaccine (1) Never done    OB 3RD TRIMESTER TDAP  11/09/2020         Pt had a tdap vaccine when pregnant

## 2021-04-15 NOTE — PATIENT INSTRUCTIONS
Migraine Headache: Care Instructions Overview Migraines are painful, throbbing headaches that often start on one side of the head. They may cause nausea and vomiting and make you sensitive to light, sound, or smell. Without treatment, migraines can last from 4 hours to a few days. Medicines can help prevent migraines or stop them after they have started. Your doctor can help you find which ones work best for you. Follow-up care is a key part of your treatment and safety. Be sure to make and go to all appointments, and call your doctor if you are having problems. It's also a good idea to know your test results and keep a list of the medicines you take. How can you care for yourself at home? · Do not drive if you have taken a prescription pain medicine. · Rest in a quiet, dark room until your headache is gone. Close your eyes, and try to relax or go to sleep. Don't watch TV or read. · Put a cold, moist cloth or cold pack on the painful area for 10 to 20 minutes at a time. Put a thin cloth between the cold pack and your skin. · Use a warm, moist towel or a heating pad set on low to relax tight shoulder and neck muscles. · Have someone gently massage your neck and shoulders. · Take your medicines exactly as prescribed. Call your doctor if you think you are having a problem with your medicine. You will get more details on the specific medicines your doctor prescribes. · Don't take medicine for headache pain too often. Talk to your doctor if you are taking medicine more than 2 days a week to stop a headache. Taking too much pain medicine can lead to more headaches. These are called medicine-overuse headaches. To prevent migraines · Keep a headache diary so you can figure out what triggers your headaches. Avoiding triggers may help you prevent headaches. Record when each headache began, how long it lasted, and what the pain was like.  Write down any other symptoms you had with the headache, such as nausea, flashing lights or dark spots, or sensitivity to bright light or loud noise. Note if the headache occurred near your period. List anything that might have triggered the headache. Triggers may include certain foods (chocolate, cheese, wine) or odors, smoke, bright light, stress, or lack of sleep. · If your doctor has prescribed medicine for your migraines, take it as directed. You may have medicine that you take only when you get a migraine and medicine that you take all the time to help prevent migraines. ? If your doctor has prescribed medicine for when you get a headache, take it at the first sign of a migraine, unless your doctor has given you other instructions. ? If your doctor has prescribed medicine to prevent migraines, take it exactly as prescribed. Call your doctor if you think you are having a problem with your medicine. · Find healthy ways to deal with stress. Migraines are most common during or right after stressful times. Try finding ways to reduce stress like practicing mindfulness or deep breathing exercises. · Get plenty of sleep and exercise. But be careful to not push yourself too hard during exercise. It may trigger a headache. · Eat meals on a regular schedule. Avoid foods and drinks that often trigger migraines. These include chocolate, alcohol (especially red wine and port), aspartame, monosodium glutamate (MSG), and some additives found in foods (such as hot dogs, de oliveira, cold cuts, aged cheeses, and pickled foods). · Limit caffeine. Don't drink too much coffee, tea, or soda. But don't quit caffeine suddenly. That can also give you migraines. · Do not smoke or allow others to smoke around you. If you need help quitting, talk to your doctor about stop-smoking programs and medicines. These can increase your chances of quitting for good. · If you are taking birth control pills or hormone therapy, talk to your doctor about whether they are triggering your migraines.  
When should you call for help? Call 911 anytime you think you may need emergency care. For example, call if: 
  · You have signs of a stroke. These may include: 
? Sudden numbness, paralysis, or weakness in your face, arm, or leg, especially on only one side of your body. ? Sudden vision changes. ? Sudden trouble speaking. ? Sudden confusion or trouble understanding simple statements. ? Sudden problems with walking or balance. ? A sudden, severe headache that is different from past headaches. Call your doctor now or seek immediate medical care if: 
  · You have new or worse nausea and vomiting.  
  · You have a new or higher fever.  
  · Your headache gets much worse. Watch closely for changes in your health, and be sure to contact your doctor if: 
  · You are not getting better after 2 days (48 hours). Where can you learn more? Go to http://www.gray.com/ Enter Q973 in the search box to learn more about \"Migraine Headache: Care Instructions. \" Current as of: August 4, 2020               Content Version: 12.8 © 2006-2021 [employer]. Care instructions adapted under license by [employer] (which disclaims liability or warranty for this information). If you have questions about a medical condition or this instruction, always ask your healthcare professional. [address] any warranty or liability for your use of this information.

## 2021-04-15 NOTE — PROGRESS NOTES
HISTORY OF PRESENT ILLNESS  Ricci Sellers is a 33 y.o. female. She was seen today for complete physical checkup and appropriate blood work. She also wants to talk about severe headaches. HPI  Health Maintenance  Immunizations:    Influenza: up to date. Tetanus: up to date. Gardasil: n/a. Cancer screening:   Cervical: reviewed guidelines, UTD, done by OBGYN, records requested. Breast: reviewed guidelines, reviewed SBE with her, UTD    Neurological Review  She is here today to talk about headaches. This is a new problem. Episodes are occuring weekly and duration of individual headaches are gradual and several months ago. The pain is described as throbbing pain, unilateral in the right frontal area, unilateral in the right temporal area. Associated symptoms: light sensitivity and nausea. Denies congestion, dehydration, dizziness, facial pain, fainting, loss of vision, motor impairment and vomiting Precipitating factors: sun exposure and stress. She denies a history of recent head injury. Treatments for headaches currently include: acetaminophen. She is taking medications as instructed and no medication side effects noted. Patient Care Team:  Harsha Mcmahan MD as PCP - General (Family Medicine)  Harsha Mcmahan MD as PCP - [facility] Empaneled Provider  Brad Fox MD (Obstetrics & Gynecology)       The following sections were reviewed & updated as appropriate: PMH, PSH, FH, and SH. Review of Systems   Constitutional: Negative for chills, fever and malaise/fatigue. HENT: Negative for congestion, ear pain, sore throat and tinnitus. Eyes: Negative for blurred vision, double vision, pain and discharge. Respiratory: Negative for cough, shortness of breath and wheezing. Cardiovascular: Negative for chest pain, palpitations and leg swelling. Gastrointestinal: Negative for abdominal pain, blood in stool, constipation, diarrhea, nausea and vomiting.    Genitourinary: Negative for dysuria, frequency, hematuria and urgency. Musculoskeletal: Negative for back pain, joint pain and myalgias. Skin: Negative for rash. Neurological: Positive for headaches. Negative for dizziness, tremors and seizures. Endo/Heme/Allergies: Negative for polydipsia. Does not bruise/bleed easily. Psychiatric/Behavioral: Negative for depression and substance abuse. The patient is not nervous/anxious. Physical Exam  Vitals signs and nursing note reviewed. Constitutional:       Appearance: She is well-developed. HENT:      Head: Normocephalic and atraumatic. Right Ear: External ear normal.      Left Ear: External ear normal.      Nose: Nose normal.   Eyes:      General: No scleral icterus. Conjunctiva/sclera: Conjunctivae normal.      Pupils: Pupils are equal, round, and reactive to light. Neck:      Musculoskeletal: Normal range of motion and neck supple. Thyroid: No thyromegaly. Vascular: No JVD. Cardiovascular:      Rate and Rhythm: Normal rate and regular rhythm. Heart sounds: Normal heart sounds. No murmur. No friction rub. No gallop. Pulmonary:      Effort: Pulmonary effort is normal.      Breath sounds: Normal breath sounds. No wheezing or rales. Chest:      Chest wall: No tenderness. Breasts: Breasts are symmetrical.         Right: No inverted nipple, mass, nipple discharge, skin change or tenderness. Left: No inverted nipple, mass, nipple discharge, skin change or tenderness. Abdominal:      General: Bowel sounds are normal. There is no distension. Palpations: Abdomen is soft. There is no mass. Tenderness: There is no abdominal tenderness. Musculoskeletal: Normal range of motion. General: No tenderness. Lymphadenopathy:      Cervical: No cervical adenopathy. Skin:     General: Skin is warm and dry. Findings: No rash. Neurological:      Mental Status: She is alert and oriented to person, place, and time. Cranial Nerves: No cranial nerve deficit. Deep Tendon Reflexes: Reflexes are normal and symmetric. Psychiatric:         Behavior: Behavior normal.         Thought Content: Thought content normal.         Judgment: Judgment normal.         ASSESSMENT and PLAN  Diagnoses and all orders for this visit:    1. Encounter for preventive health examination  -     CBC WITH AUTOMATED DIFF; Future  -     HEMOGLOBIN A1C WITH EAG; Future  -     LIPID PANEL; Future  -     METABOLIC PANEL, COMPREHENSIVE; Future  -     TSH 3RD GENERATION; Future  -     URINALYSIS W/ RFLX MICROSCOPIC; Future    2. Vitamin D deficiency  -     VITAMIN D, 25 HYDROXY; Future    3. Intractable migraine without aura and without status migrainosus  -     naproxen (NAPROSYN) 500 mg tablet; Take 1 Tab by mouth two (2) times daily as needed for Pain or Headache. Discussed lifestyle issues and health guidance given  Patient was given an after visit summary which includes diagnoses, vital signs, current medications, instructions and references & authorized prescriptions . Results of labs will be conveyed to patient, once available. Pt verbalized instructions I provided and expressed understanding of discussion that was held today.

## 2021-04-16 NOTE — PROGRESS NOTES
Called patient. Two patient identifiers verified. informed that Results for blood count, kidney and liver function, cholesterol, diabetes screening, thyroid function are very normal.   Vitamin D is low, recommended to take OTC vitamin D3 5000 units every day along with her prenatal vitamin. Urine is positive for bacteria and white cells but negative for nitrite.  No need to be on antibiotic but advised to drink more water to flush kidney and follow personal hygiene. She Verbalized understanding.  Letter placed in the mail

## 2021-04-16 NOTE — PROGRESS NOTES
Please inform patient,  Results for blood count, kidney and liver function, cholesterol, diabetes screening, thyroid function are very normal.  Vitamin D is low, recommend to take OTC vitamin D3 5000 units every day along with her prenatal vitamin. Urine is positive for bacteria and white cells but negative for nitrite. No need to be on antibiotic but to drink more water to flush kidney and follow personal hygiene.   Thanks

## 2021-07-09 ENCOUNTER — PATIENT OUTREACH (OUTPATIENT)
Dept: OTHER | Age: 31
End: 2021-07-09

## 2021-07-09 NOTE — PROGRESS NOTES
Patient eligible for [facility] ER Manager contacted the patient by telephone to discuss the maternity management program.  Patient agrees to care management services at this time. Verified name and  with patient as identifiers. Risk Factors Identified: PREVIOUSS C/SECTION/PREVIOUS COVID DIAGNOSIS    Needs to be reviewed by the provider   none         Method of communication with provider : none    Does patient have OB/Gyn Selected? Yes    Discussed follow up appointments. If no appointment was previously scheduled, appointment scheduling offered: Yes  [facility] follow up appointment(s): No future appointments. Non-[facility] follow up appointment(s): PT HAD HER 6 WEEK APPT ALREADY she delivered in Feb of this year a baby girl via c/section, no concerns and pt is rtw already. OB History:   OB History    Para Term  AB Living   1             SAB TAB Ectopic Molar Multiple Live Births                    # Outcome Date GA Lbr Lee/2nd Weight Sex Delivery Anes PTL Lv   1                 Unknown    Medication reconciliation was performed with patient, who verbalizes understanding of administration of home medications. Advised obtaining a 90-day supply of all daily and as-needed medications. Barriers/Support system:  spouse   Return to work planning? Yes  C/section delivery in 146  No complications pp. Baby doing well she is formula fed. Patient given an opportunity to ask questions and does not have any further questions or concerns at this time. Were discharge instructions available to patient? Yes. Reviewed appropriate site of care based on symptoms and resources available to patient including: Benefits related nurse triage line, When to call 911 and Condition related references. The patient agrees to contact the OB/Gyn office for questions related to their healthcare.     Pt has no cm need at this time she is doing well and baby is doing well no abrasions, no jaundice, no lesions, no pruritis, and no rashes.

## 2022-03-18 PROBLEM — E55.9 VITAMIN D DEFICIENCY: Status: ACTIVE | Noted: 2019-01-09

## 2022-03-20 PROBLEM — J32.4 CHRONIC PANSINUSITIS: Status: ACTIVE | Noted: 2018-10-04

## 2022-05-10 NOTE — ROUTINE PROCESS
TRANSFER - OUT REPORT: 
 
Verbal report given to [employer] (name) on Laila Laura  being transferred to Room 210(unit) for routine progression of care Report consisted of patients Situation, Background, Assessment and  
Recommendations(SBAR). Information from the following report(s) SBAR, Kardex, ED Summary, [facility] and Recent Results was reviewed with the receiving nurse. Lines:  
Peripheral IV 10/29/20 Right Hand (Active) Opportunity for questions and clarification was provided. Patient transported with: 
 [facility] The patient's chart has been reviewed. Okay to schedule pt for 5 year CLN recall r/t hx colon polyps with Dr. Sj Doran. Advise MAC sedation r/t hx BLAINE/use of Norco with split dose Colyte/TriLyte or equivalent(eRx) preparation.   -Eligible for NE: No r/t hx BLAINE  -Denies history of bleeding/clotting disorders.      -Please note: It is the patient's responsibility to contact his/her insurance company regarding questions about out-of-pocket cost/benefits. Please provide the appropriate diagnostic information/codes.      May continue all medications listed in the med module except:  -Anti-platelets and anti-coagulants: none  -Diabetes meds: none    ** If MAC:    - HOLD amlodipine-benazepril the night before and/or the day of the procedure(s)     - NO alcohol, recreational drugs nor erectile dysfunction medications 24 hours before procedure(s)   - NO herbal supplements or weight loss medications (phentermine/Vyvanse/Adderall) x 7 days prior to the procedure(s)    ** [facility] continue all medications as prescribed    ** COVID-19 testing required 72 hours prior to procedure    **Patient to follow-up with any new medical history/medications prior to procedure**

## 2023-05-23 RX ORDER — NAPROXEN 500 MG/1
500 TABLET ORAL 2 TIMES DAILY PRN
COMMUNITY
Start: 2021-04-15

## 2024-01-08 ENCOUNTER — OFFICE VISIT (OUTPATIENT)
Age: 34
End: 2024-01-08
Payer: MEDICAID

## 2024-01-08 VITALS
HEART RATE: 74 BPM | RESPIRATION RATE: 14 BRPM | TEMPERATURE: 97.5 F | DIASTOLIC BLOOD PRESSURE: 70 MMHG | SYSTOLIC BLOOD PRESSURE: 104 MMHG | OXYGEN SATURATION: 100 % | HEIGHT: 58 IN | BODY MASS INDEX: 25.02 KG/M2 | WEIGHT: 119.2 LBS

## 2024-01-08 DIAGNOSIS — J32.4 CHRONIC PANSINUSITIS: Primary | ICD-10-CM

## 2024-01-08 DIAGNOSIS — M94.0 COSTOCHONDRITIS: ICD-10-CM

## 2024-01-08 PROCEDURE — 99213 OFFICE O/P EST LOW 20 MIN: CPT | Performed by: FAMILY MEDICINE

## 2024-01-08 RX ORDER — AMOXICILLIN AND CLAVULANATE POTASSIUM 500; 125 MG/1; MG/1
1 TABLET, FILM COATED ORAL 2 TIMES DAILY
Qty: 20 TABLET | Refills: 0 | Status: SHIPPED | OUTPATIENT
Start: 2024-01-08 | End: 2024-01-18

## 2024-01-08 RX ORDER — NAPROXEN 250 MG/1
250 TABLET ORAL 2 TIMES DAILY WITH MEALS
Qty: 20 TABLET | Refills: 0 | Status: SHIPPED | OUTPATIENT
Start: 2024-01-08 | End: 2024-01-18

## 2024-01-08 RX ORDER — AZELASTINE 1 MG/ML
1 SPRAY, METERED NASAL 2 TIMES DAILY
Qty: 60 ML | Refills: 1 | Status: SHIPPED | OUTPATIENT
Start: 2024-01-08

## 2024-01-08 SDOH — ECONOMIC STABILITY: FOOD INSECURITY: WITHIN THE PAST 12 MONTHS, THE FOOD YOU BOUGHT JUST DIDN'T LAST AND YOU DIDN'T HAVE MONEY TO GET MORE.: NEVER TRUE

## 2024-01-08 SDOH — ECONOMIC STABILITY: INCOME INSECURITY: HOW HARD IS IT FOR YOU TO PAY FOR THE VERY BASICS LIKE FOOD, HOUSING, MEDICAL CARE, AND HEATING?: NOT HARD AT ALL

## 2024-01-08 SDOH — ECONOMIC STABILITY: FOOD INSECURITY: WITHIN THE PAST 12 MONTHS, YOU WORRIED THAT YOUR FOOD WOULD RUN OUT BEFORE YOU GOT MONEY TO BUY MORE.: NEVER TRUE

## 2024-01-08 SDOH — ECONOMIC STABILITY: HOUSING INSECURITY
IN THE LAST 12 MONTHS, WAS THERE A TIME WHEN YOU DID NOT HAVE A STEADY PLACE TO SLEEP OR SLEPT IN A SHELTER (INCLUDING NOW)?: NO

## 2024-01-08 ASSESSMENT — ENCOUNTER SYMPTOMS
SINUS PRESSURE: 1
SINUS PAIN: 1
SORE THROAT: 1
BACK PAIN: 0
ABDOMINAL PAIN: 0
DIARRHEA: 0
COUGH: 1
CONSTIPATION: 0
CHEST TIGHTNESS: 0
SHORTNESS OF BREATH: 0

## 2024-01-08 ASSESSMENT — PATIENT HEALTH QUESTIONNAIRE - PHQ9
2. FEELING DOWN, DEPRESSED OR HOPELESS: 0
SUM OF ALL RESPONSES TO PHQ QUESTIONS 1-9: 0
1. LITTLE INTEREST OR PLEASURE IN DOING THINGS: 0
SUM OF ALL RESPONSES TO PHQ9 QUESTIONS 1 & 2: 0

## 2024-01-08 NOTE — PROGRESS NOTES
Chief Complaint   Patient presents with    Neck Pain     X 2-3 days associated with headache      \"Have you been to the ER, urgent care clinic since your last visit?  Hospitalized since your last visit?\"    NO    “Have you seen or consulted any other health care providers outside of [facility] since your last visit?”    NO                   1/8/2024    10:37 AM   PHQ-9    Little interest or pleasure in doing things 0   Feeling down, depressed, or hopeless 0   PHQ-2 Score 0   PHQ-9 Total Score 0           Financial Resource Strain: Low Risk  (1/8/2024)    Overall Financial Resource Strain (CARDIA)     Difficulty of Paying Living Expenses: Not hard at all      Food Insecurity: No Food Insecurity (1/8/2024)    Hunger Vital Sign     Worried About Running Out of Food in the Last Year: Never true     Ran Out of Food in the Last Year: Never true          Health Maintenance Due   Topic Date Due    COVID-19 Vaccine (1) Never done    Varicella vaccine (1 of 2 - 2-dose childhood series) Never done    Depression Screen  Never done    HIV screen  Never done    Hepatitis C screen  Never done    Hepatitis B vaccine (3 of 3 - 19+ 3-dose series) 02/27/2014    Flu vaccine (1) 08/01/2023    Cervical cancer screen  01/09/2024

## 2024-01-08 NOTE — PROGRESS NOTES
Date:1/8/2024        Patient Name:Tin MEZA Rai     YOB: 1990     Age:34 y.o.    Seen today for   Chief Complaint   Patient presents with    Neck Pain     X 2-3 days associated with headache        HPI   URI Review  Tin returns to clinic today to talk about: URI symptoms, allergy symptoms, headache, bilateral ear fullness, pressure, bilateral sinus pain, sinus congestion, sore throat, dry cough, and fatigue that started gradual a few weeks ago, and is unchanged since that time.  Left-sided chest wall pain that is worst with deep breathing, coughing as well as palpation.  She also complains of she denies a history of: fever, chest congestion, wheezing, SOB, and WILDE.  Treatments have included: Acetaminophen, which has been  not very effective.  Relevant PMH: chronic/recurrent sinusitis and allergic rhinitis.  Patient reports sick contacts: no.        Review of Systems   Review of Systems   Constitutional:  Positive for chills.   HENT:  Positive for congestion, ear pain, postnasal drip, sinus pressure, sinus pain and sore throat.    Respiratory:  Positive for cough. Negative for chest tightness and shortness of breath.    Cardiovascular:  Positive for chest pain. Negative for palpitations.   Gastrointestinal:  Negative for abdominal pain, constipation and diarrhea.   Genitourinary:  Negative for dysuria.   Musculoskeletal:  Positive for myalgias. Negative for back pain and neck pain.   Skin:  Negative for rash.   Neurological:  Positive for headaches. Negative for dizziness.   Psychiatric/Behavioral:  Negative for behavioral problems. The patient is not nervous/anxious.        Medications     Current Outpatient Medications   Medication Sig Dispense Refill    azelastine (ASTELIN) 0.1 % nasal spray 1 spray by Nasal route 2 times daily Use in each nostril as directed 60 mL 1    naproxen (NAPROSYN) 250 MG tablet Take 1 tablet by mouth 2 times daily (with meals) for 10 days 20 tablet 0

## 2024-02-01 ENCOUNTER — OFFICE VISIT (OUTPATIENT)
Age: 34
End: 2024-02-01
Payer: MEDICAID

## 2024-02-01 VITALS
HEART RATE: 63 BPM | OXYGEN SATURATION: 100 % | DIASTOLIC BLOOD PRESSURE: 68 MMHG | BODY MASS INDEX: 24.68 KG/M2 | RESPIRATION RATE: 14 BRPM | SYSTOLIC BLOOD PRESSURE: 102 MMHG | HEIGHT: 58 IN | TEMPERATURE: 97.8 F | WEIGHT: 117.6 LBS

## 2024-02-01 DIAGNOSIS — Z13.0 SCREENING, ANEMIA, DEFICIENCY, IRON: ICD-10-CM

## 2024-02-01 DIAGNOSIS — Z11.4 SCREENING FOR HIV WITHOUT PRESENCE OF RISK FACTORS: ICD-10-CM

## 2024-02-01 DIAGNOSIS — Z01.419 WELL WOMAN EXAM WITH ROUTINE GYNECOLOGICAL EXAM: Primary | ICD-10-CM

## 2024-02-01 DIAGNOSIS — Z13.220 SCREENING FOR LIPOID DISORDERS: ICD-10-CM

## 2024-02-01 DIAGNOSIS — B37.31 YEAST VAGINITIS: ICD-10-CM

## 2024-02-01 DIAGNOSIS — R10.32 ABDOMINAL PAIN, LEFT LOWER QUADRANT: ICD-10-CM

## 2024-02-01 DIAGNOSIS — Z11.59 NEED FOR HEPATITIS C SCREENING TEST: ICD-10-CM

## 2024-02-01 LAB
ALBUMIN SERPL-MCNC: 4.1 G/DL (ref 3.5–5)
ALBUMIN/GLOB SERPL: 1.1 (ref 1.1–2.2)
ALP SERPL-CCNC: 56 U/L (ref 45–117)
ALT SERPL-CCNC: 14 U/L (ref 12–78)
ANION GAP SERPL CALC-SCNC: 5 MMOL/L (ref 5–15)
APPEARANCE UR: CLEAR
AST SERPL-CCNC: 8 U/L (ref 15–37)
BACTERIA URNS QL MICRO: NEGATIVE /HPF
BASOPHILS # BLD: 0.1 K/UL (ref 0–0.1)
BASOPHILS NFR BLD: 1 % (ref 0–1)
BILIRUB SERPL-MCNC: 0.8 MG/DL (ref 0.2–1)
BILIRUB UR QL: NEGATIVE
BUN SERPL-MCNC: 8 MG/DL (ref 6–20)
BUN/CREAT SERPL: 17 (ref 12–20)
CALCIUM SERPL-MCNC: 8.6 MG/DL (ref 8.5–10.1)
CHLORIDE SERPL-SCNC: 107 MMOL/L (ref 97–108)
CHOLEST SERPL-MCNC: 154 MG/DL
CO2 SERPL-SCNC: 28 MMOL/L (ref 21–32)
COLOR UR: ABNORMAL
CREAT SERPL-MCNC: 0.47 MG/DL (ref 0.55–1.02)
DIFFERENTIAL METHOD BLD: ABNORMAL
EOSINOPHIL # BLD: 0.3 K/UL (ref 0–0.4)
EOSINOPHIL NFR BLD: 4 % (ref 0–7)
EPITH CASTS URNS QL MICRO: ABNORMAL /LPF
ERYTHROCYTE [DISTWIDTH] IN BLOOD BY AUTOMATED COUNT: 14.6 % (ref 11.5–14.5)
GLOBULIN SER CALC-MCNC: 3.9 G/DL (ref 2–4)
GLUCOSE SERPL-MCNC: 87 MG/DL (ref 65–100)
GLUCOSE UR STRIP.AUTO-MCNC: NEGATIVE MG/DL
HCT VFR BLD AUTO: 38.1 % (ref 35–47)
HCV AB SER IA-ACNC: 0.13 INDEX
HCV AB SERPL QL IA: NONREACTIVE
HDLC SERPL-MCNC: 73 MG/DL
HDLC SERPL: 2.1 (ref 0–5)
HGB BLD-MCNC: 12 G/DL (ref 11.5–16)
HGB UR QL STRIP: ABNORMAL
HIV 1+2 AB+HIV1 P24 AG SERPL QL IA: NONREACTIVE
HIV 1/2 RESULT COMMENT: NORMAL
IMM GRANULOCYTES # BLD AUTO: 0 K/UL (ref 0–0.04)
IMM GRANULOCYTES NFR BLD AUTO: 0 % (ref 0–0.5)
KETONES UR QL STRIP.AUTO: NEGATIVE MG/DL
LDLC SERPL CALC-MCNC: 68.4 MG/DL (ref 0–100)
LEUKOCYTE ESTERASE UR QL STRIP.AUTO: ABNORMAL
LYMPHOCYTES # BLD: 2.3 K/UL (ref 0.8–3.5)
LYMPHOCYTES NFR BLD: 30 % (ref 12–49)
MCH RBC QN AUTO: 29.6 PG (ref 26–34)
MCHC RBC AUTO-ENTMCNC: 31.5 G/DL (ref 30–36.5)
MCV RBC AUTO: 94.1 FL (ref 80–99)
MONOCYTES # BLD: 0.5 K/UL (ref 0–1)
MONOCYTES NFR BLD: 6 % (ref 5–13)
NEUTS SEG # BLD: 4.5 K/UL (ref 1.8–8)
NEUTS SEG NFR BLD: 59 % (ref 32–75)
NITRITE UR QL STRIP.AUTO: NEGATIVE
NRBC # BLD: 0 K/UL (ref 0–0.01)
NRBC BLD-RTO: 0 PER 100 WBC
PH UR STRIP: 7 (ref 5–8)
PLATELET # BLD AUTO: 252 K/UL (ref 150–400)
PMV BLD AUTO: 12.8 FL (ref 8.9–12.9)
POTASSIUM SERPL-SCNC: 3.7 MMOL/L (ref 3.5–5.1)
PROT SERPL-MCNC: 8 G/DL (ref 6.4–8.2)
PROT UR STRIP-MCNC: NEGATIVE MG/DL
RBC # BLD AUTO: 4.05 M/UL (ref 3.8–5.2)
RBC #/AREA URNS HPF: ABNORMAL /HPF (ref 0–5)
SODIUM SERPL-SCNC: 140 MMOL/L (ref 136–145)
SP GR UR REFRACTOMETRY: 1.01 (ref 1–1.03)
TRIGL SERPL-MCNC: 63 MG/DL
UROBILINOGEN UR QL STRIP.AUTO: 0.2 EU/DL (ref 0.2–1)
VLDLC SERPL CALC-MCNC: 12.6 MG/DL
WBC # BLD AUTO: 7.7 K/UL (ref 3.6–11)
WBC URNS QL MICRO: ABNORMAL /HPF (ref 0–4)
YEAST URNS QL MICRO: PRESENT

## 2024-02-01 PROCEDURE — 99395 PREV VISIT EST AGE 18-39: CPT | Performed by: FAMILY MEDICINE

## 2024-02-01 ASSESSMENT — ENCOUNTER SYMPTOMS
DIARRHEA: 0
ABDOMINAL PAIN: 0
SINUS PAIN: 0
SHORTNESS OF BREATH: 0
COUGH: 0
BACK PAIN: 0
CONSTIPATION: 0
NAUSEA: 0

## 2024-02-01 ASSESSMENT — PATIENT HEALTH QUESTIONNAIRE - PHQ9
SUM OF ALL RESPONSES TO PHQ QUESTIONS 1-9: 0
2. FEELING DOWN, DEPRESSED OR HOPELESS: 0
1. LITTLE INTEREST OR PLEASURE IN DOING THINGS: 0
SUM OF ALL RESPONSES TO PHQ QUESTIONS 1-9: 0
SUM OF ALL RESPONSES TO PHQ QUESTIONS 1-9: 0
SUM OF ALL RESPONSES TO PHQ9 QUESTIONS 1 & 2: 0
SUM OF ALL RESPONSES TO PHQ QUESTIONS 1-9: 0

## 2024-02-01 NOTE — PROGRESS NOTES
Date:2024        Patient Name:Tin MEZA Rai     YOB: 1990     Age:34 y.o.    Seen today for   Chief Complaint   Patient presents with    Annual Exam     Follow up   Patient was seen today for well woman exam. She is also complaining of pain in the left lower abdomen.    HPI   Routine Gyn Exam  Patient here for routine exam.  Current Complaints: Abdominal pain.  Personal Health Questionnaire Reviewed: yes     Gynecologic History  Patient's last menstrual period was 2024 (exact date).  Contraception: none  Last Pap:  during second pregnancy  Results: normal  Last Mammogram: Not applicable      Obstetric History  : 2  Para: 2  AB: 0  2 LS CS.  Patient had COVID during second pregnancy    Review of Systems   Review of Systems   Constitutional:  Negative for fatigue and fever.   HENT:  Negative for congestion, ear pain and sinus pain.    Respiratory:  Negative for cough and shortness of breath.    Cardiovascular:  Negative for chest pain and leg swelling.   Gastrointestinal:  Negative for abdominal pain, constipation, diarrhea and nausea.   Genitourinary:  Positive for pelvic pain. Negative for dysuria and menstrual problem.   Musculoskeletal:  Negative for back pain.   Neurological:  Negative for dizziness and light-headedness.   Psychiatric/Behavioral:  Negative for behavioral problems.        Medications     Current Outpatient Medications   Medication Sig Dispense Refill    azelastine (ASTELIN) 0.1 % nasal spray 1 spray by Nasal route 2 times daily Use in each nostril as directed 60 mL 1    naproxen (NAPROSYN) 250 MG tablet Take 1 tablet by mouth 2 times daily (with meals) for 10 days (Patient not taking: Reported on 2024) 20 tablet 0     No current facility-administered medications for this visit.           Past History    Past Medical History:   has a past medical history of COVID-19 virus detected and Migraine.    Social History:   reports that she has never smoked. She has never

## 2024-02-01 NOTE — PROGRESS NOTES
Chief Complaint   Patient presents with    Annual Exam     Follow up     \"Have you been to the ER, urgent care clinic since your last visit?  Hospitalized since your last visit?\"    NO    “Have you seen or consulted any other health care providers outside of [facility] since your last visit?”    NO        “Have you had a pap smear?”    NO                2/1/2024     9:42 AM   PHQ-9    Little interest or pleasure in doing things 0   Feeling down, depressed, or hopeless 0   PHQ-2 Score 0   PHQ-9 Total Score 0           Financial Resource Strain: Low Risk  (1/8/2024)    Overall Financial Resource Strain (CARDIA)     Difficulty of Paying Living Expenses: Not hard at all      Food Insecurity: No Food Insecurity (1/8/2024)    Hunger Vital Sign     Worried About Running Out of Food in the Last Year: Never true     Ran Out of Food in the Last Year: Never true          Health Maintenance Due   Topic Date Due    COVID-19 Vaccine (1) Never done    Varicella vaccine (1 of 2 - 2-dose childhood series) Never done    HIV screen  Never done    Hepatitis C screen  Never done    Hepatitis B vaccine (3 of 3 - 19+ 3-dose series) 02/27/2014    Flu vaccine (1) 08/01/2023    DTaP/Tdap/Td vaccine (2 - Td or Tdap) 01/23/2024    Cervical cancer screen  01/09/2024

## 2024-02-07 LAB
CYTOLOGIST CVX/VAG CYTO: NORMAL
CYTOLOGY CVX/VAG DOC CYTO: NORMAL
CYTOLOGY CVX/VAG DOC THIN PREP: NORMAL
HPV GENOTYPE REFLEX: NORMAL
HPV I/H RISK 4 DNA CVX QL PROBE+SIG AMP: NEGATIVE
Lab: NORMAL
Lab: NORMAL
OTHER STN SPEC: NORMAL
STAT OF ADQ CVX/VAG CYTO-IMP: NORMAL

## 2025-02-22 ENCOUNTER — HOSPITAL ENCOUNTER (EMERGENCY)
Facility: HOSPITAL | Age: 35
Discharge: HOME | End: 2025-02-23
Attending: EMERGENCY MEDICINE
Payer: MEDICAID

## 2025-02-22 VITALS
DIASTOLIC BLOOD PRESSURE: 58 MMHG | TEMPERATURE: 98.1 F | WEIGHT: 119.49 LBS | RESPIRATION RATE: 17 BRPM | SYSTOLIC BLOOD PRESSURE: 125 MMHG | BODY MASS INDEX: 27.65 KG/M2 | HEIGHT: 55 IN | HEART RATE: 65 BPM | OXYGEN SATURATION: 100 %

## 2025-02-22 DIAGNOSIS — G43.009 MIGRAINE WITHOUT AURA AND WITHOUT STATUS MIGRAINOSUS, NOT INTRACTABLE: Primary | ICD-10-CM

## 2025-02-22 LAB
ALBUMIN SERPL-MCNC: 3.6 G/DL (ref 3.5–5)
ALBUMIN/GLOB SERPL: 0.8 (ref 1.1–2.2)
ALP SERPL-CCNC: 68 U/L (ref 45–117)
ALT SERPL-CCNC: 15 U/L (ref 12–78)
ANION GAP SERPL CALC-SCNC: 4 MMOL/L (ref 2–12)
AST SERPL-CCNC: 11 U/L (ref 15–37)
BASOPHILS # BLD: 0.05 K/UL (ref 0–0.1)
BASOPHILS NFR BLD: 0.7 % (ref 0–1)
BILIRUB SERPL-MCNC: 0.7 MG/DL (ref 0.2–1)
BUN SERPL-MCNC: 7 MG/DL (ref 6–20)
BUN/CREAT SERPL: 13 (ref 12–20)
CALCIUM SERPL-MCNC: 8.5 MG/DL (ref 8.5–10.1)
CHLORIDE SERPL-SCNC: 107 MMOL/L (ref 97–108)
CO2 SERPL-SCNC: 26 MMOL/L (ref 21–32)
COMMENT:: NORMAL
CREAT SERPL-MCNC: 0.56 MG/DL (ref 0.55–1.02)
DIFFERENTIAL METHOD BLD: ABNORMAL
EOSINOPHIL # BLD: 0.18 K/UL (ref 0–0.4)
EOSINOPHIL NFR BLD: 2.4 % (ref 0–7)
ERYTHROCYTE [DISTWIDTH] IN BLOOD BY AUTOMATED COUNT: 15 % (ref 11.5–14.5)
GLOBULIN SER CALC-MCNC: 4.4 G/DL (ref 2–4)
GLUCOSE SERPL-MCNC: 108 MG/DL (ref 65–100)
HCT VFR BLD AUTO: 36 % (ref 35–47)
HGB BLD-MCNC: 11.7 G/DL (ref 11.5–16)
IMM GRANULOCYTES # BLD AUTO: 0.03 K/UL (ref 0–0.04)
IMM GRANULOCYTES NFR BLD AUTO: 0.4 % (ref 0–0.5)
LYMPHOCYTES # BLD: 2.54 K/UL (ref 0.8–3.5)
LYMPHOCYTES NFR BLD: 34.2 % (ref 12–49)
MCH RBC QN AUTO: 29.2 PG (ref 26–34)
MCHC RBC AUTO-ENTMCNC: 32.5 G/DL (ref 30–36.5)
MCV RBC AUTO: 89.8 FL (ref 80–99)
MONOCYTES # BLD: 0.53 K/UL (ref 0–1)
MONOCYTES NFR BLD: 7.1 % (ref 5–13)
NEUTS SEG # BLD: 4.09 K/UL (ref 1.8–8)
NEUTS SEG NFR BLD: 55.2 % (ref 32–75)
NRBC # BLD: 0 K/UL (ref 0–0.01)
NRBC BLD-RTO: 0 PER 100 WBC
PLATELET # BLD AUTO: 252 K/UL (ref 150–400)
PMV BLD AUTO: 12.3 FL (ref 8.9–12.9)
POTASSIUM SERPL-SCNC: 3.1 MMOL/L (ref 3.5–5.1)
PROT SERPL-MCNC: 8 G/DL (ref 6.4–8.2)
RBC # BLD AUTO: 4.01 M/UL (ref 3.8–5.2)
SODIUM SERPL-SCNC: 137 MMOL/L (ref 136–145)
SPECIMEN HOLD: NORMAL
WBC # BLD AUTO: 7.4 K/UL (ref 3.6–11)

## 2025-02-22 PROCEDURE — 96361 HYDRATE IV INFUSION ADD-ON: CPT

## 2025-02-22 PROCEDURE — 99284 EMERGENCY DEPT VISIT MOD MDM: CPT

## 2025-02-22 PROCEDURE — 6370000000 HC RX 637 (ALT 250 FOR IP): Performed by: STUDENT IN AN ORGANIZED HEALTH CARE EDUCATION/TRAINING PROGRAM

## 2025-02-22 PROCEDURE — 6360000002 HC RX W HCPCS: Performed by: STUDENT IN AN ORGANIZED HEALTH CARE EDUCATION/TRAINING PROGRAM

## 2025-02-22 PROCEDURE — 85025 COMPLETE CBC W/AUTO DIFF WBC: CPT

## 2025-02-22 PROCEDURE — 80053 COMPREHEN METABOLIC PANEL: CPT

## 2025-02-22 PROCEDURE — 36415 COLL VENOUS BLD VENIPUNCTURE: CPT

## 2025-02-22 PROCEDURE — 96374 THER/PROPH/DIAG INJ IV PUSH: CPT

## 2025-02-22 PROCEDURE — 96375 TX/PRO/DX INJ NEW DRUG ADDON: CPT

## 2025-02-22 PROCEDURE — 2580000003 HC RX 258: Performed by: STUDENT IN AN ORGANIZED HEALTH CARE EDUCATION/TRAINING PROGRAM

## 2025-02-22 RX ORDER — POTASSIUM CHLORIDE 750 MG/1
40 TABLET, EXTENDED RELEASE ORAL ONCE
Status: COMPLETED | OUTPATIENT
Start: 2025-02-22 | End: 2025-02-22

## 2025-02-22 RX ORDER — KETOROLAC TROMETHAMINE 30 MG/ML
15 INJECTION, SOLUTION INTRAMUSCULAR; INTRAVENOUS ONCE
Status: COMPLETED | OUTPATIENT
Start: 2025-02-22 | End: 2025-02-22

## 2025-02-22 RX ORDER — DEXAMETHASONE SODIUM PHOSPHATE 10 MG/ML
10 INJECTION, SOLUTION INTRAMUSCULAR; INTRAVENOUS ONCE
Status: COMPLETED | OUTPATIENT
Start: 2025-02-22 | End: 2025-02-22

## 2025-02-22 RX ORDER — DIPHENHYDRAMINE HYDROCHLORIDE 50 MG/ML
25 INJECTION INTRAMUSCULAR; INTRAVENOUS
Status: COMPLETED | OUTPATIENT
Start: 2025-02-22 | End: 2025-02-22

## 2025-02-22 RX ORDER — 0.9 % SODIUM CHLORIDE 0.9 %
1000 INTRAVENOUS SOLUTION INTRAVENOUS ONCE
Status: COMPLETED | OUTPATIENT
Start: 2025-02-22 | End: 2025-02-22

## 2025-02-22 RX ORDER — PROCHLORPERAZINE EDISYLATE 5 MG/ML
10 INJECTION INTRAMUSCULAR; INTRAVENOUS ONCE
Status: COMPLETED | OUTPATIENT
Start: 2025-02-22 | End: 2025-02-22

## 2025-02-22 RX ADMIN — DIPHENHYDRAMINE HYDROCHLORIDE 25 MG: 50 INJECTION INTRAMUSCULAR; INTRAVENOUS at 23:01

## 2025-02-22 RX ADMIN — KETOROLAC TROMETHAMINE 15 MG: 30 INJECTION, SOLUTION INTRAMUSCULAR; INTRAVENOUS at 23:02

## 2025-02-22 RX ADMIN — POTASSIUM CHLORIDE 40 MEQ: 750 TABLET, FILM COATED, EXTENDED RELEASE ORAL at 23:42

## 2025-02-22 RX ADMIN — SODIUM CHLORIDE 1000 ML: 9 INJECTION, SOLUTION INTRAVENOUS at 23:00

## 2025-02-22 RX ADMIN — DEXAMETHASONE SODIUM PHOSPHATE 10 MG: 10 INJECTION, SOLUTION INTRAMUSCULAR; INTRAVENOUS at 23:04

## 2025-02-22 RX ADMIN — PROCHLORPERAZINE EDISYLATE 10 MG: 5 INJECTION INTRAMUSCULAR; INTRAVENOUS at 23:04

## 2025-02-22 ASSESSMENT — LIFESTYLE VARIABLES
HOW OFTEN DO YOU HAVE A DRINK CONTAINING ALCOHOL: NEVER
HOW MANY STANDARD DRINKS CONTAINING ALCOHOL DO YOU HAVE ON A TYPICAL DAY: PATIENT DOES NOT DRINK

## 2025-02-22 ASSESSMENT — PAIN - FUNCTIONAL ASSESSMENT
PAIN_FUNCTIONAL_ASSESSMENT: ACTIVITIES ARE NOT PREVENTED
PAIN_FUNCTIONAL_ASSESSMENT: PREVENTS OR INTERFERES SOME ACTIVE ACTIVITIES AND ADLS
PAIN_FUNCTIONAL_ASSESSMENT: 0-10

## 2025-02-22 ASSESSMENT — PAIN SCALES - GENERAL
PAINLEVEL_OUTOF10: 10
PAINLEVEL_OUTOF10: 10

## 2025-02-22 ASSESSMENT — PAIN DESCRIPTION - LOCATION
LOCATION: HEAD
LOCATION: HEAD

## 2025-02-22 ASSESSMENT — PAIN DESCRIPTION - ORIENTATION
ORIENTATION: LEFT
ORIENTATION: LEFT;RIGHT;ANTERIOR

## 2025-02-22 ASSESSMENT — PAIN DESCRIPTION - DESCRIPTORS
DESCRIPTORS: DISCOMFORT
DESCRIPTORS: ACHING;DISCOMFORT

## 2025-02-22 ASSESSMENT — PAIN DESCRIPTION - FREQUENCY: FREQUENCY: INTERMITTENT

## 2025-02-23 NOTE — ED TRIAGE NOTES
Pt amb to rtiage w/ c/o left vi HA and diziness x 1 day w/ nausea/vom x 1.  Pt associates photophobia but denies vision changes.

## 2025-02-23 NOTE — ED NOTES
Patient discharged by JesMADY pt sent to the front lobby with family, with strong and steady gait, no acute distress noted at time of discharge - Discharge information / home RX / and reasons to return to the ED were reviewed by the ED provider.

## 2025-02-23 NOTE — ED PROVIDER NOTES
[facility] EMERGENCY DEPARTMENT  EMERGENCY DEPARTMENT ENCOUNTER      Pt Name: Tin MEZA Rai  MRN: [id number]  Birthdate 1990  Date of evaluation: 2025  Provider: MADY SMITH    CHIEF COMPLAINT       Chief Complaint   Patient presents with    Dizziness    Headache         HISTORY OF PRESENT ILLNESS   (Location/Symptom, Timing/Onset, Context/Setting, Quality, Duration, Modifying Factors, Severity)  Note limiting factors.   35 y.o. female presents to ED with headache. Patient reports that since this morning she has had slight dizziness and headache. She has tried motrin last at 1930 and tylenol at 1400 with little relief. She denies any head trauma. She notes one episode of  vomiting today. Denies any vision changes, numbness, tingling. She notes history of migraines and reports that this feels similar.             Review of External Medical Records:     Nursing Notes were reviewed.    REVIEW OF SYSTEMS    (2-9 systems for level 4, 10 or more for level 5)     Review of Systems   Constitutional:  Negative for chills and fever.   HENT:  Negative for congestion, ear pain and sore throat.    Eyes:  Negative for pain.   Respiratory:  Negative for cough and shortness of breath.    Cardiovascular:  Negative for chest pain.   Gastrointestinal:  Negative for abdominal pain, diarrhea, nausea and vomiting.   Genitourinary:  Negative for dysuria and flank pain.   Musculoskeletal:  Negative for myalgias.   Skin:  Negative for rash.   Neurological:  Positive for headaches. Negative for dizziness.   Hematological:  Negative for adenopathy.       Except as noted above the remainder of the review of systems was reviewed and negative.       PAST MEDICAL HISTORY     Past Medical History:   Diagnosis Date    COVID-19 virus detected     Migraine     since age 13 years         SURGICAL HISTORY       Past Surgical History:   Procedure Laterality Date     SECTION  2016     SECTION  2021

## 2025-02-24 ENCOUNTER — TELEPHONE (OUTPATIENT)
Age: 35
End: 2025-02-24

## 2025-02-24 NOTE — TELEPHONE ENCOUNTER
Patient called need a ED follow up no appointments available.    Requesting a call back    Best call back #[phone]

## 2025-02-25 ENCOUNTER — OFFICE VISIT (OUTPATIENT)
Age: 35
End: 2025-02-25
Payer: MEDICAID

## 2025-02-25 VITALS
BODY MASS INDEX: 27.31 KG/M2 | HEIGHT: 55 IN | HEART RATE: 65 BPM | DIASTOLIC BLOOD PRESSURE: 65 MMHG | RESPIRATION RATE: 16 BRPM | WEIGHT: 118 LBS | TEMPERATURE: 98.2 F | OXYGEN SATURATION: 99 % | SYSTOLIC BLOOD PRESSURE: 105 MMHG

## 2025-02-25 DIAGNOSIS — G43.719 INTRACTABLE CHRONIC MIGRAINE WITHOUT AURA AND WITHOUT STATUS MIGRAINOSUS: Primary | ICD-10-CM

## 2025-02-25 PROCEDURE — 99213 OFFICE O/P EST LOW 20 MIN: CPT | Performed by: FAMILY MEDICINE

## 2025-02-25 RX ORDER — BUTALBITAL, ACETAMINOPHEN AND CAFFEINE 50; 325; 40 MG/1; MG/1; MG/1
1 TABLET ORAL EVERY 4 HOURS PRN
Qty: 60 TABLET | Refills: 0 | Status: SHIPPED | OUTPATIENT
Start: 2025-02-25

## 2025-02-25 RX ORDER — METOPROLOL SUCCINATE 25 MG/1
25 TABLET, EXTENDED RELEASE ORAL
Qty: 90 TABLET | Refills: 1 | Status: SHIPPED | OUTPATIENT
Start: 2025-02-25

## 2025-02-25 SDOH — ECONOMIC STABILITY: FOOD INSECURITY: WITHIN THE PAST 12 MONTHS, YOU WORRIED THAT YOUR FOOD WOULD RUN OUT BEFORE YOU GOT MONEY TO BUY MORE.: NEVER TRUE

## 2025-02-25 SDOH — ECONOMIC STABILITY: FOOD INSECURITY: WITHIN THE PAST 12 MONTHS, THE FOOD YOU BOUGHT JUST DIDN'T LAST AND YOU DIDN'T HAVE MONEY TO GET MORE.: NEVER TRUE

## 2025-02-25 ASSESSMENT — PATIENT HEALTH QUESTIONNAIRE - PHQ9
2. FEELING DOWN, DEPRESSED OR HOPELESS: NOT AT ALL
SUM OF ALL RESPONSES TO PHQ QUESTIONS 1-9: 0
SUM OF ALL RESPONSES TO PHQ9 QUESTIONS 1 & 2: 0
1. LITTLE INTEREST OR PLEASURE IN DOING THINGS: NOT AT ALL

## 2025-02-25 ASSESSMENT — ENCOUNTER SYMPTOMS
ABDOMINAL PAIN: 0
CHEST TIGHTNESS: 0
COUGH: 0
BACK PAIN: 0
CONSTIPATION: 0
DIARRHEA: 0
SORE THROAT: 0
SHORTNESS OF BREATH: 0

## 2025-02-25 NOTE — PROGRESS NOTES
Date:2/25/2025        Patient Name:Tin MEZA Rai     YOB: 1990     Age:35 y.o.    Seen today for   Chief Complaint   Patient presents with    Follow-Up from Hospital     ED follow up    Headache     Pt has been having headaches. But got worst Friday night to Saturday 2/22/25.   The patient (or guardian, if applicable) and other individuals in attendance with the patient were advised that Artificial Intelligence will be utilized during this visit to record, process the conversation to generate a clinical note, and support improvement of the AI technology. The patient (or guardian, if applicable) and other individuals in attendance at the appointment consented to the use of AI, including the recording.      HPI     History of Present Illness  The patient presents for evaluation of migraine.    She sought emergency care at [facility] 3 days ago due to a severe migraine accompanied by dizziness, nausea, and vomiting. The prescribed treatment, which included potassium chloride, IV fluids, Toradol injection, Promethazine, Compazine injection, Benadryl injection, and Decadron injection, effectively alleviated her symptoms within a 3-hour period. She has a history of chronic migraines, which she managed without medication until last year when she started using Naproxen as needed. However, on the most recent Saturday, her morning migraine was unresponsive to Tylenol and Motrin, prompting her emergency department visit. The frequency of her migraines varies, occurring biweekly or every 2 to 3 days, with occasional relief from Tylenol or naproxen. She reports an increased frequency of migraines recently. She is not pregnant, breastfeeding, or using hormonal birth control. She is not currently taking vitamin C or prenatal vitamins. She experiences photophobia during her migraines.    Supplemental Information  She is not currently on any medications except for a nasal spray as needed. She has an appointment

## 2025-02-25 NOTE — PROGRESS NOTES
Chief Complaint   Patient presents with    Follow-Up from Hospital     ED follow up         \"Have you been to the ER, urgent care clinic since your last visit?  Hospitalized since your last visit?\"    YES - When: approximately 3 days ago.  Where and Why: [facility]. Migrane without aura and without status migrainosus, Not intractable.    “Have you seen or consulted any other health care providers outside of [facility] since your last visit?”    NO            Click Here for Release of Records Request           2/25/2025     2:39 PM   PHQ-9    Little interest or pleasure in doing things 0   Feeling down, depressed, or hopeless 0   PHQ-2 Score 0   PHQ-9 Total Score 0           Financial Resource Strain: Low Risk  (1/8/2024)    Overall Financial Resource Strain (CARDIA)     Difficulty of Paying Living Expenses: Not hard at all      Food Insecurity: No Food Insecurity (2/25/2025)    Hunger Vital Sign     Worried About Running Out of Food in the Last Year: Never true     Ran Out of Food in the Last Year: Never true          Health Maintenance Due   Topic Date Due    Varicella vaccine (1 of 2 - 13+ 2-dose series) Never done    Hepatitis B vaccine (3 of 3 - 19+ 3-dose series) 02/27/2014    Diabetes screen  01/01/2023    Flu vaccine (1) 08/01/2024    COVID-19 Vaccine (1 - 2024-25 season) Never done    Depression Screen  02/01/2025

## 2025-06-16 ENCOUNTER — OFFICE VISIT (OUTPATIENT)
Age: 35
End: 2025-06-16
Payer: MEDICAID

## 2025-06-16 VITALS
SYSTOLIC BLOOD PRESSURE: 103 MMHG | WEIGHT: 114.4 LBS | DIASTOLIC BLOOD PRESSURE: 65 MMHG | OXYGEN SATURATION: 100 % | RESPIRATION RATE: 16 BRPM | BODY MASS INDEX: 26.47 KG/M2 | TEMPERATURE: 97.5 F | HEART RATE: 68 BPM | HEIGHT: 55 IN

## 2025-06-16 DIAGNOSIS — D50.0 IRON DEFICIENCY ANEMIA DUE TO CHRONIC BLOOD LOSS: ICD-10-CM

## 2025-06-16 DIAGNOSIS — G43.719 INTRACTABLE CHRONIC MIGRAINE WITHOUT AURA AND WITHOUT STATUS MIGRAINOSUS: ICD-10-CM

## 2025-06-16 DIAGNOSIS — Z00.00 WELL WOMAN EXAM (NO GYNECOLOGICAL EXAM): Primary | ICD-10-CM

## 2025-06-16 DIAGNOSIS — Z13.1 SCREENING FOR DIABETES MELLITUS: ICD-10-CM

## 2025-06-16 DIAGNOSIS — E55.9 VITAMIN D DEFICIENCY: ICD-10-CM

## 2025-06-16 DIAGNOSIS — R25.2 LEG CRAMPS: ICD-10-CM

## 2025-06-16 DIAGNOSIS — N92.0 MENORRHAGIA WITH REGULAR CYCLE: ICD-10-CM

## 2025-06-16 DIAGNOSIS — B37.31 YEAST VAGINITIS: ICD-10-CM

## 2025-06-16 PROCEDURE — 99214 OFFICE O/P EST MOD 30 MIN: CPT | Performed by: FAMILY MEDICINE

## 2025-06-16 ASSESSMENT — ENCOUNTER SYMPTOMS
SORE THROAT: 0
DIARRHEA: 0
CHEST TIGHTNESS: 0
SHORTNESS OF BREATH: 0
ABDOMINAL PAIN: 0
BACK PAIN: 0
COUGH: 0
CONSTIPATION: 0

## 2025-06-16 NOTE — PROGRESS NOTES
Date:6/16/2025        Patient Name:Tin MEZA Rai     YOB: 1990     Age:35 y.o.  The patient (or guardian, if applicable) and other individuals in attendance with the patient were advised that Artificial Intelligence will be utilized during this visit to record, process the conversation to generate a clinical note, and support improvement of the AI technology. The patient (or guardian, if applicable) and other individuals in attendance at the appointment consented to the use of AI, including the recording.        Seen today for   Chief Complaint   Patient presents with    Annual Exam       HPI     History of Present Illness  The patient is a 35-year-old female who presents today for an annual physical.    She reports a decrease in weight, despite maintaining a regular diet. Her menstrual cycle is regular, occurring monthly, with a duration of 6 to 7 days. She experiences significant pain and heavy bleeding on the second day of her cycle. She is not currently using any form of birth control. She has received 3 doses of the COVID-19 vaccine. She has not had chickenpox as a child.    She also reports experiencing leg cramps, which she manages by wearing support socks. She maintains adequate hydration, consuming hot water in the morning and throughout the day. The pain is present both during the day and at night.    She was in the ER for a migraine on 02/22/2025 and was given a migraine cocktail, which worked for her.    GYNECOLOGICAL HISTORY:  - Duration: 6 to 7 days  - Frequency and Flow: Monthly, heavy bleeding on the second day  - Menstrual Pain: Significant pain on the second day    SOCIAL HISTORY  She works in housekeeping at [employer].        Review of Systems   Review of Systems   Constitutional:  Negative for fever.   HENT:  Negative for congestion, ear pain and sore throat.    Respiratory:  Negative for cough, chest tightness and shortness of breath.    Cardiovascular:  Negative for

## 2025-06-16 NOTE — PROGRESS NOTES
Chief Complaint   Patient presents with    Annual Exam         \"Have you been to the ER, urgent care clinic since your last visit?  Hospitalized since your last visit?\"    Yes- 2/22/2025- Migraine without aura and without status migrainosus, not intractable     “Have you seen or consulted any other health care providers outside of [facility] since your last visit?”    NO            Click Here for Release of Records Request           2/25/2025     2:39 PM   PHQ-9    Little interest or pleasure in doing things 0   Feeling down, depressed, or hopeless 0   PHQ-2 Score 0   PHQ-9 Total Score 0           Financial Resource Strain: Low Risk  (1/8/2024)    Overall Financial Resource Strain (CARDIA)     Difficulty of Paying Living Expenses: Not hard at all      Food Insecurity: No Food Insecurity (2/25/2025)    Hunger Vital Sign     Worried About Running Out of Food in the Last Year: Never true     Ran Out of Food in the Last Year: Never true          Health Maintenance Due   Topic Date Due    Varicella vaccine (1 of 2 - 13+ 2-dose series) Never done    Hepatitis B vaccine (3 of 3 - 19+ 3-dose series) 02/27/2014    Diabetes screen  01/01/2023    COVID-19 Vaccine (1 - 2024-25 season) Never done

## 2025-06-17 LAB
25(OH)D3 SERPL-MCNC: 18.4 NG/ML (ref 30–100)
ALBUMIN SERPL-MCNC: 4.2 G/DL (ref 3.5–5)
ALBUMIN/GLOB SERPL: 1.1 (ref 1.1–2.2)
ALP SERPL-CCNC: 70 U/L (ref 45–117)
ALT SERPL-CCNC: 20 U/L (ref 12–78)
ANION GAP SERPL CALC-SCNC: 5 MMOL/L (ref 2–12)
APPEARANCE UR: CLEAR
AST SERPL-CCNC: 8 U/L (ref 15–37)
BACTERIA URNS QL MICRO: ABNORMAL /HPF
BASOPHILS # BLD: 0.06 K/UL (ref 0–0.1)
BASOPHILS NFR BLD: 0.7 % (ref 0–1)
BILIRUB SERPL-MCNC: 0.5 MG/DL (ref 0.2–1)
BILIRUB UR QL: NEGATIVE
BUN SERPL-MCNC: 6 MG/DL (ref 6–20)
BUN/CREAT SERPL: 11 (ref 12–20)
CALCIUM SERPL-MCNC: 8.9 MG/DL (ref 8.5–10.1)
CHLORIDE SERPL-SCNC: 104 MMOL/L (ref 97–108)
CO2 SERPL-SCNC: 28 MMOL/L (ref 21–32)
COLOR UR: ABNORMAL
CREAT SERPL-MCNC: 0.53 MG/DL (ref 0.55–1.02)
DIFFERENTIAL METHOD BLD: ABNORMAL
EOSINOPHIL # BLD: 0.12 K/UL (ref 0–0.4)
EOSINOPHIL NFR BLD: 1.5 % (ref 0–7)
EPITH CASTS URNS QL MICRO: ABNORMAL /LPF
ERYTHROCYTE [DISTWIDTH] IN BLOOD BY AUTOMATED COUNT: 15 % (ref 11.5–14.5)
EST. AVERAGE GLUCOSE BLD GHB EST-MCNC: 100 MG/DL
GLOBULIN SER CALC-MCNC: 4 G/DL (ref 2–4)
GLUCOSE SERPL-MCNC: 93 MG/DL (ref 65–100)
GLUCOSE UR STRIP.AUTO-MCNC: NEGATIVE MG/DL
HBA1C MFR BLD: 5.1 % (ref 4–5.6)
HCT VFR BLD AUTO: 40.5 % (ref 35–47)
HGB BLD-MCNC: 12.3 G/DL (ref 11.5–16)
HGB UR QL STRIP: NEGATIVE
IMM GRANULOCYTES # BLD AUTO: 0.02 K/UL (ref 0–0.04)
IMM GRANULOCYTES NFR BLD AUTO: 0.2 % (ref 0–0.5)
IRON SATN MFR SERPL: 7 % (ref 20–50)
IRON SERPL-MCNC: 27 UG/DL (ref 35–150)
KETONES UR QL STRIP.AUTO: NEGATIVE MG/DL
LEUKOCYTE ESTERASE UR QL STRIP.AUTO: NEGATIVE
LYMPHOCYTES # BLD: 2.06 K/UL (ref 0.8–3.5)
LYMPHOCYTES NFR BLD: 25.4 % (ref 12–49)
MCH RBC QN AUTO: 28.9 PG (ref 26–34)
MCHC RBC AUTO-ENTMCNC: 30.4 G/DL (ref 30–36.5)
MCV RBC AUTO: 95.1 FL (ref 80–99)
MONOCYTES # BLD: 0.52 K/UL (ref 0–1)
MONOCYTES NFR BLD: 6.4 % (ref 5–13)
NEUTS SEG # BLD: 5.32 K/UL (ref 1.8–8)
NEUTS SEG NFR BLD: 65.8 % (ref 32–75)
NITRITE UR QL STRIP.AUTO: NEGATIVE
NRBC # BLD: 0 K/UL (ref 0–0.01)
NRBC BLD-RTO: 0 PER 100 WBC
PH UR STRIP: 7.5 (ref 5–8)
PLATELET # BLD AUTO: 290 K/UL (ref 150–400)
PMV BLD AUTO: 12.5 FL (ref 8.9–12.9)
POTASSIUM SERPL-SCNC: 3.6 MMOL/L (ref 3.5–5.1)
PROT SERPL-MCNC: 8.2 G/DL (ref 6.4–8.2)
PROT UR STRIP-MCNC: NEGATIVE MG/DL
RBC # BLD AUTO: 4.26 M/UL (ref 3.8–5.2)
RBC #/AREA URNS HPF: ABNORMAL /HPF (ref 0–5)
SODIUM SERPL-SCNC: 137 MMOL/L (ref 136–145)
SP GR UR REFRACTOMETRY: 1 (ref 1–1.03)
TIBC SERPL-MCNC: 373 UG/DL (ref 250–450)
TSH SERPL DL<=0.05 MIU/L-ACNC: 0.93 UIU/ML (ref 0.36–3.74)
UROBILINOGEN UR QL STRIP.AUTO: 0.2 EU/DL (ref 0.2–1)
WBC # BLD AUTO: 8.1 K/UL (ref 3.6–11)
WBC URNS QL MICRO: ABNORMAL /HPF (ref 0–4)
YEAST URNS QL MICRO: PRESENT

## 2025-06-20 ENCOUNTER — RESULTS FOLLOW-UP (OUTPATIENT)
Age: 35
End: 2025-06-20

## 2025-06-20 DIAGNOSIS — B37.31 YEAST VAGINITIS: ICD-10-CM

## 2025-06-20 DIAGNOSIS — D50.0 IRON DEFICIENCY ANEMIA DUE TO CHRONIC BLOOD LOSS: ICD-10-CM

## 2025-06-20 DIAGNOSIS — E55.9 VITAMIN D DEFICIENCY: Primary | ICD-10-CM

## 2025-06-20 RX ORDER — FLUCONAZOLE 150 MG/1
150 TABLET ORAL
Qty: 2 TABLET | Refills: 0 | Status: SHIPPED | OUTPATIENT
Start: 2025-06-20 | End: 2025-06-26

## 2025-06-20 RX ORDER — ERGOCALCIFEROL 1.25 MG/1
50000 CAPSULE, LIQUID FILLED ORAL WEEKLY
Qty: 12 CAPSULE | Refills: 1 | Status: SHIPPED | OUTPATIENT
Start: 2025-06-20

## 2025-06-20 RX ORDER — FERROUS SULFATE 325(65) MG
325 TABLET ORAL
Qty: 90 TABLET | Refills: 1 | Status: SHIPPED | OUTPATIENT
Start: 2025-06-20

## 2025-06-25 ENCOUNTER — TELEPHONE (OUTPATIENT)
Age: 35
End: 2025-06-25